# Patient Record
Sex: MALE | Race: BLACK OR AFRICAN AMERICAN | Employment: UNEMPLOYED | URBAN - METROPOLITAN AREA
[De-identification: names, ages, dates, MRNs, and addresses within clinical notes are randomized per-mention and may not be internally consistent; named-entity substitution may affect disease eponyms.]

---

## 2020-10-16 ENCOUNTER — TELEPHONE (OUTPATIENT)
Dept: CARDIOLOGY CLINIC | Age: 40
End: 2020-10-16

## 2020-10-16 NOTE — TELEPHONE ENCOUNTER
Patient called back to reschedule new patient appointment due to transportation issues. Appointment is scheduled on Thursday October 22nd at 1pm w/JEREMY Patterson

## 2020-10-16 NOTE — TELEPHONE ENCOUNTER
New Referral received from Dr. Davida Franklin . Office number #  Fax number #[phone]    Patient has been contacted and scheduled with Dr. Lucrecia Cheek on Wednesday October 21st at 1pm.    Informed referring office that patient has been scheduled.

## 2020-10-22 ENCOUNTER — TELEPHONE (OUTPATIENT)
Dept: CARDIOLOGY CLINIC | Age: 40
End: 2020-10-22

## 2020-10-22 ENCOUNTER — OFFICE VISIT (OUTPATIENT)
Dept: CARDIOLOGY CLINIC | Age: 40
End: 2020-10-22
Payer: MEDICAID

## 2020-10-22 VITALS
WEIGHT: 173.6 LBS | SYSTOLIC BLOOD PRESSURE: 96 MMHG | BODY MASS INDEX: 25.71 KG/M2 | HEIGHT: 69 IN | HEART RATE: 67 BPM | OXYGEN SATURATION: 98 % | DIASTOLIC BLOOD PRESSURE: 62 MMHG | TEMPERATURE: 97.6 F

## 2020-10-22 DIAGNOSIS — R06.02 SHORTNESS OF BREATH: ICD-10-CM

## 2020-10-22 DIAGNOSIS — E55.9 VITAMIN D DEFICIENCY: ICD-10-CM

## 2020-10-22 DIAGNOSIS — R53.83 FATIGUE, UNSPECIFIED TYPE: ICD-10-CM

## 2020-10-22 DIAGNOSIS — Z11.59 ENCOUNTER FOR HEPATITIS C SCREENING TEST FOR LOW RISK PATIENT: ICD-10-CM

## 2020-10-22 DIAGNOSIS — M10.9 GOUT, UNSPECIFIED CAUSE, UNSPECIFIED CHRONICITY, UNSPECIFIED SITE: ICD-10-CM

## 2020-10-22 DIAGNOSIS — Z11.4 SCREENING FOR HIV (HUMAN IMMUNODEFICIENCY VIRUS): ICD-10-CM

## 2020-10-22 DIAGNOSIS — R68.89 SUSPECTED LYME DISEASE: ICD-10-CM

## 2020-10-22 DIAGNOSIS — I50.9 CONGESTIVE HEART FAILURE, UNSPECIFIED HF CHRONICITY, UNSPECIFIED HEART FAILURE TYPE (HCC): Primary | ICD-10-CM

## 2020-10-22 DIAGNOSIS — I50.9 CONGESTIVE HEART FAILURE, UNSPECIFIED HF CHRONICITY, UNSPECIFIED HEART FAILURE TYPE (HCC): ICD-10-CM

## 2020-10-22 PROCEDURE — 93000 ELECTROCARDIOGRAM COMPLETE: CPT | Performed by: INTERNAL MEDICINE

## 2020-10-22 PROCEDURE — 99205 OFFICE O/P NEW HI 60 MIN: CPT | Performed by: INTERNAL MEDICINE

## 2020-10-22 RX ORDER — FUROSEMIDE 40 MG/1
TABLET ORAL DAILY
COMMUNITY
End: 2020-11-16 | Stop reason: SDUPTHER

## 2020-10-22 RX ORDER — FUROSEMIDE 40 MG/1
40 TABLET ORAL DAILY
Qty: 30 TAB | Refills: 1 | Status: SHIPPED | OUTPATIENT
Start: 2020-10-22 | End: 2020-11-17

## 2020-10-22 RX ORDER — SACUBITRIL AND VALSARTAN 24; 26 MG/1; MG/1
1 TABLET, FILM COATED ORAL 2 TIMES DAILY
COMMUNITY
End: 2020-11-06 | Stop reason: SDUPTHER

## 2020-10-22 RX ORDER — CARVEDILOL 3.12 MG/1
3.12 TABLET ORAL 2 TIMES DAILY WITH MEALS
Qty: 30 TAB | Refills: 1 | Status: SHIPPED | OUTPATIENT
Start: 2020-10-22 | End: 2021-01-10

## 2020-10-22 RX ORDER — WARFARIN 7.5 MG/1
7.5 TABLET ORAL DAILY
COMMUNITY
End: 2022-10-19 | Stop reason: ALTCHOICE

## 2020-10-22 RX ORDER — CARVEDILOL 6.25 MG/1
TABLET ORAL 2 TIMES DAILY WITH MEALS
COMMUNITY
End: 2020-10-22 | Stop reason: DRUGHIGH

## 2020-10-22 RX ORDER — SPIRONOLACTONE 25 MG/1
25 TABLET ORAL DAILY
COMMUNITY
End: 2020-11-06

## 2020-10-22 NOTE — PROGRESS NOTES
[facility] in [place], [facility] Note    Patient name: Eyad Duran  Patient : 1980  Patient MRN: [id number]  Date of service: 10/22/20    Primary care physician: No primary care provider on file.   Primary general cardiologist:  Dr. Imelda Aguilera, NP  Primary F cardiologist: Latoya Mancia MD    CHIEF COMPLAINT:  Chronic systolic heart failure    PLAN:  · Patient with severe cardiomyopathy and advanced heart failure symptoms, unable to tolerate GDMT due to hypotension, if no recovery of LVEF by 2020 we will initiate workup for heart transplant/LVAD - closest transplant site would be [facility] and we will facilitate referral if needed  · Importance of abstinence from substance use was discussed with patient and mother; patient will need 6 months period substance free to be considered for cardiac replacement therapy and formal enrollment in counseling and treatment program if addiction disorder is identified    Continue current medical therapy for heart failure  Decrease current dose of coreg to 3.125mg twice daily due to hypotension  Decrease current dose of entresto to one/half tablet of 24/26mg twice daily  Continue current dose of spironolactone 25mg daily  Continue current dose of lasix 40mg daily  Not on allopurinol or uloric, check uric acid level  Chronic anticoagulation with coumadin, followed by primary cardiologist  Continue high dose vitamin D weekly, check levels  Does not take ASA or lipitor  Continue lifevest for at least 3 months or until resolution of LV thrombus before referral for ICD   Consider sleep study; prefer when off THC  Schedule cardiac MRI   Obtain genetic testing  Counseled on echo/ekg screen of first degree relatives and children starting in adolescence  Labs for heart failure: CBC, BMP, LFT, pro-NT-BNP, iron profile, thyroid profile, vitamin D level, lipid profile, CPK, gammopathy profile, UA with culture and pregnancy test, HIV, hepatitis, coxackie abs A and B, Lyme abs, parvovirus PCR, HHV 7, mycoplasma abs, sed rate, D-dimer  Reinforced low salt diet  Reinforced fluid restriction to 6 x 8oz glasses per day  Commanded on tobacco and alcohol abstinence  Counseled on importance of THC wean; concern re: addiction disorder and provided with counseling options  Provided patient with arm blood pressure cuff  Document in diary BP/HR and daily weights  Provided educational materials \"Living with heart failure\"   Provided advanced care plan forms to be filled out    Offerred nutritionist for HF diet  Follow-up with primary cardiologist  Recommend flu and pneumonia vaccinations  Provided patient education materials for COVID precautions  Obtain ischemic evaluation from referring MD  Provided list of counselers who might have if patient has difficulty stopping THC  Return to [facility] in one month with NP/MD to review results of testing    IMPRESSION:  Fatigue  Shortness of breath  Volume overload, mild  Chronic systolic heart failure   Stage C, NYHA class IIIA symptoms   Patient diagnosed with heart failure 3 years ago (per patient report, no records)   Reportedly non-ischemic cardiomyopathy, LVEF 10%   H/o chronic troponin elevation   No record of LHC   LV thrombus 2.7 x 3cm (by echo 9/2020)  · H/o renal infarcts  · H/o splenic infarcts  · Chronic anticoagulation with coumadin  At risk for sudden cardiac death  · Lifevest  Cardiac risk factors   HTN   HL   Former tobacco use, 1.25 pack years, quit 2015   Alcohol use, 5-6 drinks >4 times weekly, quit 9/2020   Marijuana, daily  CKD, stage 3 (baseline Cr 2.3)  Bronchitis  Anxiety    CARDIAC IMAGING:  Echo (9/16/20) LVEF 10%, LVEDD 5.4cm, LV thrombus 2.7x 3cm, reduced RV function, mild PAH  EKG (9/19/20) sinus tachycardia 102bpm, QRS 95ms  LHC no records    HEMODYNAMICS:  RHC not done  CPEST not done  6MW not done    FAMILY HISTORY:  Mother - hypertension  Father - stroke  Sister - no known problems  Maternal grandfather had heart failure requirin heart transplant  All of maternal grandfather's brothers and sisters with heart failure  Maternal cousin with heart failure  Maternal cousin with obesity    SOCIAL HISTORY:  Not working since 9/2020; applied for disability  Lives in [place], [place] - this is 2[place] hours from [place] and 2 hours from [facility], 2 biological children (one years old and 8years old, both healthy)  Former tobacco use, 1.25 pack years, quit 2015  Alcohol use, 5-6 drinks >4 times weekly, quit 9/2020  Marijuana, daily  Lives alone    HISTORY OF PRESENT ILLNESS:  I had the pleasure of seeing Susan Alvares in [address] at [facility] in [place]. Briefly, Susan Alvares is a 38 y.o. male with h/o tobacco (quite 2015), alcohol (quit 9/2020) and THC use (daily) and strong family history of several members with early cardiomyopathy and cardiac deaths. Patient presents with chronic systolic heart failure, stage C, NYHA class IIIA symptoms, states was diagnosed with heart failure 3 years ago (per patient report, no records). He was off medications for several months. Patient was then admitted with acute heart failure, reportedly non-ischemic cardiomyopathy, LVEF 10% (no record of [facility]), LV thrombus 2.7 x 3cm (by echo 9/2020) c/b splenic and renal infarcts started on chronic anticoagulation with coumadin. Discharged home with lifevest and referred to [facility] today for further evaluation and treatment of severe cardiomyopathy. Patient lives [place] hours from [place], he lives closer to [place] but for the next couple of months would like to follow with us. If needs heart transplant [facility] would be the closer site. INTERVAL HISTORY:  Today, patient presents for initial clinic visit accompanied by his mother. Patient is doing \"okay\".     Patient can walk on a flat surface only for 10 minutes and has to stop due to fatigue and dyspnea. Patient states he can walk more than one block without symptoms of fatigue or shortness of breath. Patient cannot walk one flight of stairs; he has fatigue and/or shortness of breath at few steps. Patient lives alone but can perform home activities without problem. .     Patient denies symptoms of volume overload now, leg edema much improved with diuretics though still 1+ BLE. Patient denies abdominal bloating or change of appetite. Patient's weight remained stable. Patient denies orthopnea, PND or significant nocturia. This too improved with diuretics    Patient denies irregular heart rate or palpitations. [employer] has not fired. He does not have BP cuff at home. Patient denies other cardiac symptoms such as chest pain or leg pain with walking. Patient is compliant with fluid restriction and taking medications as prescribed. Patient manages his own medications. REVIEW OF SYSTEMS:  General: Denies fever, night sweats. Ear, nose and throat: Denies difficulty hearing, sinus problems, runny nose, post-nasal drip, ringing in ears, mouth sores, loose teeth, ear pain, nosebleeds, sore throate, facial pain or numbess  Cardiovascular: see above in the interval history  Respiratory: Denies cough, wheezing, sputum production, hemoptysis.   Gastrointestinal: Denies heartburn, constipation, intolerance to certain foods, diarrhea, abdominal pain, nausea, vomiting, difficulty swallowing, blood in stool  Kidney and bladder: Denies painful urination, frequent urination, urgency, prostate problems and impotence  Musculoskeletal: Denies joint pain, muscle weakness  Skin and hair: Denies change in existing skin lesions, hair loss or increase, breast changes    PHYSICAL EXAM:  Visit Vitals  BP 96/62 (BP 1 Location: Left arm, BP Patient Position: At rest)   Pulse 67   Temp 97.6 °F (36.4 °C) (Oral)   Ht 5' 9\" (1.753 m)   Wt 173 lb 9.6 oz (78.7 kg)   SpO2 98%   BMI 25.64 kg/m²     General: Patient is well developed, well-nourished in no acute distress  HEENT: Normocephalic and atraumatic. No scleral icterus. Pupils are equal, round and reactive to light and accomodation. No conjunctival injection. Oropharynx is clear. Neck: Supple. No evidence of thyroid enlargements or lymphadenopathy. JVD: Cannot be appreciated   Lungs: Breath sounds are equal and clear bilaterally. No wheezes, rhonchi, or rales. Heart: Regular rate and rhythm with normal S1 and S2. No murmurs, gallops or rubs. Abdomen: Soft, no mass or tenderness. No organomegaly or hernia. Bowel sounds present. Genitourinary and rectal: deferred  Extremities: No cyanosis, clubbing, or edema. Neurologic: No focal sensory or motor deficits are noted. Grossly intact. Psychiatric: Awake, alert an doriented x 3. Appropriate mood and affect. Skin: Warm, dry and well perfused. No lesions, nodules or rashes are noted. Current Outpatient Medications on File Prior to Visit   Medication Sig Dispense Refill    spironolactone (ALDACTONE) 25 mg tablet Take 25 mg by mouth daily. 0.5 tab daily      sacubitriL-valsartan (Entresto) 24-26 mg tablet Take 1 Tab by mouth two (2) times a day.  warfarin (COUMADIN) 7.5 mg tablet Take 7.5 mg by mouth daily.  furosemide (LASIX) 40 mg tablet Take  by mouth daily. No current facility-administered medications on file prior to visit. Thank you for your referral and allowing me to participate in this patient's care.     Shawn Richardson MD PhD  [address] [place] [address], [address]  Phone: [phone]  Fax: [phone]    Total visit time: 85 minutes (> 50% spent face-to-face counseling)

## 2020-10-22 NOTE — PATIENT INSTRUCTIONS
Medication changes:    DECREASE carvedilol (coreg) to 3.125 mg-one tab twice a day. A new prescription has been sent to your pharmacy. You can take 1/2 of the 6.25 mg tablets twice a day until you  your new prescription. DECREASE sacubitril-valsartan (Entresto) 24-26 mg to 1/2 tab twice a day. Change Furosemide (lasix) take 40 mg in the morning and 20 mg (1/2 tab) in the afternoon until you reach lose 3 pounds. Then go back to 40 mg once a day. A calendar provided today to log daily blood pressures and weights. Call  Monday with your readings. Please take this to your pharmacy to notify them of the change in medications. Testing Ordered:    Orthostatics done in clinic today. EKG completed today. To to [facility] today and have labs drawn. You will be notified of any abnormal results    An order for cardiac MRI has been placed to be done. You will be receiving an automated call from Coordination of Care to schedule this test. If you are unavailable to receive the call or would like to contact coordination of care yourself you may contact [phone] to schedule. Other Recommendations:    No alcohol, tobacco or marijuana. Please call Everette Mccarthy with [facility] to discuss downloading readings. [phone]     Blood pressure cuff provided today. Please take your blood pressure in the morning before medications and then again 2 hours after your medications. Call Monday with Blood pressure readings    Living with Heart Failure booklet provided today. Referral to [address] [place] [address],[address] will be contacted for scheduling. Advanced medical directive paperwork provided today. Please complete and bring to next office visit. Sign at the next visit. List of psychologists provided today. Ensure your drinking an adequate amount of water with a goal of 6-8 eight ounce glasses (1.5-2 liters) of fluid daily. Your urine should be clear and light yellow straw colored.       If your blood pressure begins to consistently run below 90/60 and/or you begin to experience dizziness or lightheadedness, please contact the [facility]il[facility]to [facility]ime [facility] at [phone]. Follow up  One month with Edilberto Jaime 1721 MD/NP    Please monitor your blood pressures daily prior to medications and 2 hours after taking medications. Bring a written record of your blood pressures to your next appointment. Please monitor your weights daily upon waking and after using the bathroom. Keep a written records of your weights and bring to your next appointment. If you have a weight gain of 3 or more pounds overnight OR 5 or more pounds in one week please contact our office. Thank you for allowing us the privilege of being a part of your healthcare team! Please do not hesitate to contact our office at [phone] with any questions or concerns. Advance Directives: Care Instructions  Overview  An advance directive is a legal way to state your wishes at the end of your life. It tells your family and your doctor what to do if you can't say what you want. There are two main types of advance directives. You can change them any time your wishes change. Living will. This form tells your family and your doctor your wishes about life support and other treatment. The form is also called a declaration. Medical power of . This form lets you name a person to make treatment decisions for you when you can't speak for yourself. This person is called a health care agent (health care proxy, health care surrogate). The form is also called a durable power of  for health care. If you do not have an advance directive, decisions about your medical care may be made by a family member, or by a doctor or a  who doesn't know you. It may help to think of an advance directive as a gift to the people who care for you. If you have one, they won't have to make tough decisions by themselves.   Follow-up care is a key part of your treatment and safety. Be sure to make and go to all appointments, and call your doctor if you are having problems. It's also a good idea to know your test results and keep a list of the medicines you take. What should you include in an advance directive? Many states have a unique advance directive form. (It may ask you to address specific issues.) Or you might use a universal form that's approved by many states. If your form doesn't tell you what to address, it may be hard to know what to include in your advance directive. Use the questions below to help you get started. · Who do you want to make decisions about your medical care if you are not able to? · What life-support measures do you want if you have a serious illness that gets worse over time or can't be cured? · What are you most afraid of that might happen? (Maybe you're afraid of having pain, losing your independence, or being kept alive by machines.)  · Where would you prefer to die? (Your home? A hospital? A nursing home?)  · Do you want to donate your organs when you die? · Do you want certain Mormon practices performed before you die? When should you call for help? Be sure to contact your doctor if you have any questions. Where can you learn more? Go to http://www.gray.com/  Enter R264 in the search box to learn more about \"Advance Directives: Care Instructions. \"  Current as of: December 9, 2019               Content Version: 12.6  © 5670-1155 [employer]. Care instructions adapted under license by [employer] (which disclaims liability or warranty for this information). If you have questions about a medical condition or this instruction, always ask your healthcare professional. [place] [address] any warranty or liability for your use of this information.       Low Sodium Diet (2,000 Milligram): Care Instructions  Your Care Instructions     Too much sodium causes your body to hold on to extra water. This can raise your blood pressure and force your heart and kidneys to work harder. In very serious cases, this could cause you to be put in the hospital. It might even be life-threatening. By limiting sodium, you will feel better and lower your risk of serious problems. The most common source of sodium is salt. People get most of the salt in their diet from canned, prepared, and packaged foods. Fast food and restaurant meals also are very high in sodium. Your doctor will probably limit your sodium to less than 2,000 milligrams (mg) a day. This limit counts all the sodium in prepared and packaged foods and any salt you add to your food. Follow-up care is a key part of your treatment and safety. Be sure to make and go to all appointments, and call your doctor if you are having problems. It's also a good idea to know your test results and keep a list of the medicines you take. How can you care for yourself at home? Read food labels  · Read labels on cans and food packages. The labels tell you how much sodium is in each serving. Make sure that you look at the serving size. If you eat more than the serving size, you have eaten more sodium. · Food labels also tell you the Percent Daily Value for sodium. Choose products with low Percent Daily Values for sodium. · Be aware that sodium can come in forms other than salt, including monosodium glutamate (MSG), sodium citrate, and sodium bicarbonate (baking soda). MSG is often added to Asian food. When you eat out, you can sometimes ask for food without MSG or added salt. Buy low-sodium foods  · Buy foods that are labeled \"unsalted\" (no salt added), \"sodium-free\" (less than 5 mg of sodium per serving), or \"low-sodium\" (less than 140 mg of sodium per serving). Foods labeled \"reduced-sodium\" and \"light sodium\" may still have too much sodium. Be sure to read the label to see how much sodium you are getting.   · Buy fresh vegetables, or frozen vegetables without added sauces. Buy low-sodium versions of canned vegetables, soups, and other canned goods. Prepare low-sodium meals  · Cut back on the amount of salt you use in cooking. This will help you adjust to the taste. Do not add salt after cooking. One teaspoon of salt has about 2,300 mg of sodium. · Take the salt shaker off the table. · Flavor your food with garlic, lemon juice, onion, vinegar, herbs, and spices. Do not use soy sauce, lite soy sauce, steak sauce, onion salt, garlic salt, celery salt, mustard, or ketchup on your food. · Use low-sodium salad dressings, sauces, and ketchup. Or make your own salad dressings and sauces without adding salt. · Use less salt (or none) when recipes call for it. You can often use half the salt a recipe calls for without losing flavor. Other foods such as rice, pasta, and grains do not need added salt. · Rinse canned vegetables, and cook them in fresh water. This removes some--but not all--of the salt. · Avoid water that is naturally high in sodium or that has been treated with water softeners, which add sodium. Call your local water company to find out the sodium content of your water supply. If you buy bottled water, read the label and choose a sodium-free brand. Avoid high-sodium foods  · Avoid eating:  ? Smoked, cured, salted, and canned meat, fish, and poultry. ? Ham, fields, hot dogs, and luncheon meats. ? Regular, hard, and processed cheese and regular peanut butter. ? Crackers with salted tops, and other salted snack foods such as pretzels, chips, and salted popcorn. ? Frozen prepared meals, unless labeled low-sodium. ? Canned and dried soups, broths, and bouillon, unless labeled sodium-free or low-sodium. ? Canned vegetables, unless labeled sodium-free or low-sodium. ? [place] fries, pizza, tacos, and other fast foods.   ? Pickles, olives, ketchup, and other condiments, especially soy sauce, unless labeled sodium-free or low-sodium. Where can you learn more? Go to http://www.gray.com/  Enter V843 in the search box to learn more about \"Low Sodium Diet (2,000 Milligram): Care Instructions. \"  Current as of: August 22, 2019               Content Version: 12.6  © 8854-0113 [employer]. Care instructions adapted under license by [employer] (which disclaims liability or warranty for this information). If you have questions about a medical condition or this instruction, always ask your healthcare professional. [place] [address] any warranty or liability for your use of this information. Heart Failure Patients and COVID-19  March 31, 2020 in Heart Failure Research News, [facility] News     A Statement from the [address]) wants to ensure that heart failure patients are appropriately informed during the novel Coronavirus COVID-19 pandemic. COVID-19 symptoms vary among infected people and can include cough, fever, shortness of breath, muscle aches, profound fatigue, loss of sense of smell and taste, and diarrhea. Not every infected person will have symptoms which is why social distancing is imperative. Most people have only mild symptoms, but others have severe symptoms that require hospitalization and occasionally intensive care. Because you are a patient with a heart failure, you are at higher risk of getting very sick if you contract Coronavirus and, therefore, it is important to practice good hand hygiene, social distancing, and staying at home as much as possible. If you are experiencing symptoms of COVID-19, please call your primary healthcare clinician.  For your safety and the safety of others, and to reduce potential exposures to the Coronavirus, please do not go to an urgent care clinic or emergency room for non-urgent or non-emergency issues unless you have been instructed to do so by your primary healthcare clinician. However, if you have life-threatening symptoms like difficulty breathing, call 911. We want to reduce the spread of the Coronavirus as much as possible and make sure our healthcare resources are not overwhelmed with non-urgent cases. You may have noticed that your healthcare clinicians have converted your in-person appointments to telephone or video calls, and some hospitals are not allowing visitors. The goal is to keep patients from taran the Coronavirus and to limit the number of healthcare workers in the hospital. If you are sick and need to be seen or get lab testing, you should still do so; otherwise, you can help by     1. Learning how to use your smartphone or computer to participate in telehealth video visits  2. Keeping track of missed visits and studies and working with your team to reschedule them once social distancing measures are relaxed     Also, do not stop any of your medications unless instructed by your healthcare team to do so. It is important that you have an adequate supply of your heart failure medications and that you request extended duration of supplies and refills from your providers during these times. We have a lot to learn about COVID-19 and currently there are several ongoing clinical trials to discover therapies that might help treat the infection and vaccines that can prevent the infection. The [facility] and other scientific institutions are working hard, with our patients in mind, to get through this pandemic as quickly as possible. Finally, we recommend that you get your information from trusted, professional healthcare sources including national societies like the [employer], federal agencies like the [employer], and your local hospitals and health departments. Please access updated patient information on the [facility] [phone].      We wish you safety and health during this challenging time.

## 2020-10-26 LAB
25(OH)D3+25(OH)D2 SERPL-MCNC: 34.2 NG/ML (ref 30–100)
ALBUMIN SERPL ELPH-MCNC: 3.8 G/DL (ref 2.9–4.4)
ALBUMIN SERPL-MCNC: 4.1 G/DL (ref 4–5)
ALBUMIN/GLOB SERPL: 1.7 {RATIO} (ref 0.7–1.7)
ALBUMIN/GLOB SERPL: 2.1 {RATIO} (ref 1.2–2.2)
ALP SERPL-CCNC: 144 IU/L (ref 39–117)
ALPHA1 GLOB SERPL ELPH-MCNC: 0.2 G/DL (ref 0–0.4)
ALPHA2 GLOB SERPL ELPH-MCNC: 0.6 G/DL (ref 0.4–1)
ALT SERPL-CCNC: 41 IU/L (ref 0–44)
APPEARANCE UR: CLEAR
AST SERPL-CCNC: 33 IU/L (ref 0–40)
B BURGDOR IGG+IGM SER-ACNC: <0.91 ISR (ref 0–0.9)
B-GLOBULIN SERPL ELPH-MCNC: 1 G/DL (ref 0.7–1.3)
B19V IGG SER IA-ACNC: 1.2 INDEX (ref 0–0.8)
B19V IGM SER IA-ACNC: 0.1 INDEX (ref 0–0.8)
B2 MICROGLOB SERPL-MCNC: 2.4 MG/L (ref 0.6–2.4)
BACTERIA #/AREA URNS HPF: ABNORMAL /[HPF]
BILIRUB SERPL-MCNC: 1 MG/DL (ref 0–1.2)
BILIRUB UR QL STRIP: NEGATIVE
BUN SERPL-MCNC: 21 MG/DL (ref 6–24)
BUN/CREAT SERPL: 14 (ref 9–20)
CALCIUM SERPL-MCNC: 9.5 MG/DL (ref 8.7–10.2)
CASTS URNS MICRO: ABNORMAL
CASTS URNS QL MICRO: PRESENT /LPF
CHLORIDE SERPL-SCNC: 104 MMOL/L (ref 96–106)
CHOLEST SERPL-MCNC: 140 MG/DL (ref 100–199)
CK SERPL-CCNC: 112 U/L (ref 49–439)
CO2 SERPL-SCNC: 22 MMOL/L (ref 20–29)
COLOR UR: YELLOW
CREAT SERPL-MCNC: 1.54 MG/DL (ref 0.76–1.27)
CV B1 AB TITR SER CF: NEGATIVE {TITER}
CV B2 AB TITR SER CF: ABNORMAL {TITER}
CV B3 AB TITR SER CF: ABNORMAL {TITER}
CV B4 AB TITR SER CF: ABNORMAL {TITER}
CV B5 AB TITR SER CF: ABNORMAL {TITER}
CV B6 AB TITR SER CF: ABNORMAL {TITER}
D DIMER PPP FEU-MCNC: 0.27 MG/L FEU (ref 0–0.49)
EPI CELLS #/AREA URNS HPF: ABNORMAL /HPF (ref 0–10)
ERYTHROCYTE [DISTWIDTH] IN BLOOD BY AUTOMATED COUNT: 13.8 % (ref 11.6–15.4)
ERYTHROCYTE [SEDIMENTATION RATE] IN BLOOD BY WESTERGREN METHOD: 2 MM/HR (ref 0–15)
GAMMA GLOB SERPL ELPH-MCNC: 0.6 G/DL (ref 0.4–1.8)
GLOBULIN SER CALC-MCNC: 2 G/DL (ref 1.5–4.5)
GLOBULIN SER-MCNC: 2.3 G/DL (ref 2.2–3.9)
GLUCOSE SERPL-MCNC: 114 MG/DL (ref 65–99)
GLUCOSE UR QL: NEGATIVE
HAV IGM SERPL QL IA: NEGATIVE
HBV CORE IGM SERPL QL IA: NEGATIVE
HBV SURFACE AG SERPL QL IA: NEGATIVE
HCT VFR BLD AUTO: 47.1 % (ref 37.5–51)
HCV AB S/CO SERPL IA: <0.1 S/CO RATIO (ref 0–0.9)
HDLC SERPL-MCNC: 43 MG/DL
HGB BLD-MCNC: 15.5 G/DL (ref 13–17.7)
HGB UR QL STRIP: NEGATIVE
HIV 1+2 AB+HIV1 P24 AG SERPL QL IA: NON REACTIVE
IGA SERPL-MCNC: 231 MG/DL (ref 90–386)
IGG SERPL-MCNC: 751 MG/DL (ref 603–1613)
IGM SERPL-MCNC: 23 MG/DL (ref 20–172)
INTERPRETATION SERPL IEP-IMP: ABNORMAL
KAPPA LC FREE SER-MCNC: 28.3 MG/L (ref 3.3–19.4)
KAPPA LC FREE/LAMBDA FREE SER: 1.42 {RATIO} (ref 0.26–1.65)
KETONES UR QL STRIP: NEGATIVE
LAMBDA LC FREE SERPL-MCNC: 20 MG/L (ref 5.7–26.3)
LDLC SERPL CALC-MCNC: 79 MG/DL (ref 0–99)
LEUKOCYTE ESTERASE UR QL STRIP: NEGATIVE
M PROTEIN SERPL ELPH-MCNC: ABNORMAL G/DL
MAGNESIUM SERPL-MCNC: 2.4 MG/DL (ref 1.6–2.3)
MCH RBC QN AUTO: 28.2 PG (ref 26.6–33)
MCHC RBC AUTO-ENTMCNC: 32.9 G/DL (ref 31.5–35.7)
MCV RBC AUTO: 86 FL (ref 79–97)
MICRO URNS: ABNORMAL
MUCOUS THREADS URNS QL MICRO: PRESENT
NITRITE UR QL STRIP: NEGATIVE
NT-PROBNP SERPL-MCNC: 5125 PG/ML (ref 0–86)
PH UR STRIP: 5.5 [PH] (ref 5–7.5)
PLATELET # BLD AUTO: 197 X10E3/UL (ref 150–450)
PLEASE NOTE:, 149534: ABNORMAL
POTASSIUM SERPL-SCNC: 5 MMOL/L (ref 3.5–5.2)
PROT SERPL-MCNC: 6.1 G/DL (ref 6–8.5)
PROT UR QL STRIP: ABNORMAL
RBC # BLD AUTO: 5.5 X10E6/UL (ref 4.14–5.8)
RBC #/AREA URNS HPF: ABNORMAL /HPF (ref 0–2)
SODIUM SERPL-SCNC: 139 MMOL/L (ref 134–144)
SP GR UR: 1.01 (ref 1–1.03)
T3FREE SERPL-MCNC: 2.8 PG/ML (ref 2–4.4)
T4 FREE SERPL-MCNC: 1.42 NG/DL (ref 0.82–1.77)
TRIGL SERPL-MCNC: 98 MG/DL (ref 0–149)
TSH SERPL DL<=0.005 MIU/L-ACNC: 2.44 UIU/ML (ref 0.45–4.5)
URATE SERPL-MCNC: 10.5 MG/DL (ref 3.7–8.6)
UROBILINOGEN UR STRIP-MCNC: 0.2 MG/DL (ref 0.2–1)
VLDLC SERPL CALC-MCNC: 18 MG/DL (ref 5–40)
WBC # BLD AUTO: 6.5 X10E3/UL (ref 3.4–10.8)
WBC #/AREA URNS HPF: ABNORMAL /HPF (ref 0–5)

## 2020-10-30 ENCOUNTER — TELEPHONE (OUTPATIENT)
Dept: CARDIOLOGY CLINIC | Age: 40
End: 2020-10-30

## 2020-10-30 NOTE — TELEPHONE ENCOUNTER
Attempted to call patient on mobile number and no voicemail, attempted to call patient on home number and left voicemail to return my call to discuss lab results per Dr. Daley Course start allopurinol 100 mg daily per Dr. Karen Johnson and patient also needs follow up appt for one month.

## 2020-11-02 ENCOUNTER — TELEPHONE (OUTPATIENT)
Dept: CARDIOLOGY CLINIC | Age: 40
End: 2020-11-02

## 2020-11-02 DIAGNOSIS — I50.9 CONGESTIVE HEART FAILURE, UNSPECIFIED HF CHRONICITY, UNSPECIFIED HEART FAILURE TYPE (HCC): Primary | ICD-10-CM

## 2020-11-02 DIAGNOSIS — M10.00 IDIOPATHIC GOUT, UNSPECIFIED CHRONICITY, UNSPECIFIED SITE: ICD-10-CM

## 2020-11-02 DIAGNOSIS — M10.9 GOUT, UNSPECIFIED CAUSE, UNSPECIFIED CHRONICITY, UNSPECIFIED SITE: ICD-10-CM

## 2020-11-02 NOTE — TELEPHONE ENCOUNTER
Follow up call to patient regarding BP readings and weights  Goal to lose 3 lbs pt to take furosemide 40 mg in a.m. and 20 mg in p.m. until pt loses 3 lbs. (confirm picked up new rx for cavedilol, taking 1/2 tab of Entresto 24-26 mg BID,   LVM with my contact information and instructions to return my call. Called home #; LVM. Called work # which patient states is his mobile number. Patient was not home and could not provide weights but believes he has gained 3 lbs. Asked patient to call this writer back today with weights and will discuss medications at that time. Pt reports he is almost out of medications. Patient also needs a follow up appointment within the next 2-3 weeks.

## 2020-11-05 RX ORDER — ACETAMINOPHEN 500 MG
2000 TABLET ORAL DAILY
Qty: 30 CAP | Refills: 3 | Status: SHIPPED | OUTPATIENT
Start: 2020-11-05 | End: 2021-02-19 | Stop reason: DRUGHIGH

## 2020-11-05 NOTE — TELEPHONE ENCOUNTER
Called patient for weights. Patient reports the following: 10/30: 179 lbs  10/31: 179 lbs  11/1:   177 lbs  11/2    179 lbs  11/3    179 lbs  No weights for 11/4 or 11/5  Pt reports taking furosemide 40 mg daily. Pt states he is running out of spironolactone, entresto and furosemide. Confirmed with pharmacy the furosemide is there waiting to be picked up and patient picked up carvedilol. Pt will need to start allupurinol 100 mg. Reviewed with Dr. Daniele rodriguez to prescribe with warfarin  Discussed with Dr. Daniele Parra, V.O.R.B. patient to remain on 12.5 mg of spironolactone; Refills for spironolactone and entresto sent to [facility].

## 2020-11-06 RX ORDER — SACUBITRIL AND VALSARTAN 24; 26 MG/1; MG/1
TABLET, FILM COATED ORAL
Qty: 60 TAB | Refills: 0 | Status: SHIPPED | OUTPATIENT
Start: 2020-11-06 | End: 2020-11-16

## 2020-11-06 RX ORDER — ALLOPURINOL 100 MG/1
100 TABLET ORAL DAILY
Qty: 30 TAB | Refills: 0 | Status: SHIPPED | OUTPATIENT
Start: 2020-11-06 | End: 2020-11-16 | Stop reason: SDUPTHER

## 2020-11-06 RX ORDER — SPIRONOLACTONE 25 MG/1
TABLET ORAL
Qty: 30 TAB | Refills: 0 | Status: SHIPPED | OUTPATIENT
Start: 2020-11-06 | End: 2020-11-16

## 2020-11-16 ENCOUNTER — OFFICE VISIT (OUTPATIENT)
Dept: CARDIOLOGY CLINIC | Age: 40
End: 2020-11-16
Payer: MEDICAID

## 2020-11-16 ENCOUNTER — TELEPHONE ANTICOAG (OUTPATIENT)
Dept: CARDIOLOGY CLINIC | Age: 40
End: 2020-11-16

## 2020-11-16 ENCOUNTER — TELEPHONE (OUTPATIENT)
Dept: CARDIOLOGY CLINIC | Age: 40
End: 2020-11-16

## 2020-11-16 VITALS
DIASTOLIC BLOOD PRESSURE: 108 MMHG | BODY MASS INDEX: 24.5 KG/M2 | TEMPERATURE: 98.2 F | HEIGHT: 69 IN | HEART RATE: 99 BPM | SYSTOLIC BLOOD PRESSURE: 138 MMHG | RESPIRATION RATE: 20 BRPM | WEIGHT: 165.4 LBS | OXYGEN SATURATION: 98 %

## 2020-11-16 DIAGNOSIS — E85.9 AMYLOIDOSIS, UNSPECIFIED TYPE (HCC): ICD-10-CM

## 2020-11-16 DIAGNOSIS — R06.02 SHORTNESS OF BREATH: ICD-10-CM

## 2020-11-16 DIAGNOSIS — M10.9 GOUT, UNSPECIFIED CAUSE, UNSPECIFIED CHRONICITY, UNSPECIFIED SITE: ICD-10-CM

## 2020-11-16 DIAGNOSIS — E85.82 WILD-TYPE TRANSTHYRETIN-RELATED (ATTR) AMYLOIDOSIS (HCC): Primary | ICD-10-CM

## 2020-11-16 DIAGNOSIS — I50.9 CONGESTIVE HEART FAILURE, UNSPECIFIED HF CHRONICITY, UNSPECIFIED HEART FAILURE TYPE (HCC): ICD-10-CM

## 2020-11-16 DIAGNOSIS — M10.00 IDIOPATHIC GOUT, UNSPECIFIED CHRONICITY, UNSPECIFIED SITE: ICD-10-CM

## 2020-11-16 LAB
ALBUMIN SERPL-MCNC: 3.8 G/DL (ref 3.5–5)
ALBUMIN/GLOB SERPL: 1.2 {RATIO} (ref 1.1–2.2)
ALP SERPL-CCNC: 150 U/L (ref 45–117)
ALT SERPL-CCNC: 41 U/L (ref 12–78)
ANION GAP SERPL CALC-SCNC: 11 MMOL/L (ref 5–15)
AST SERPL-CCNC: 33 U/L (ref 15–37)
BILIRUB SERPL-MCNC: 2.3 MG/DL (ref 0.2–1)
BNP SERPL-MCNC: ABNORMAL PG/ML
BUN SERPL-MCNC: 23 MG/DL (ref 6–20)
BUN/CREAT SERPL: 15 (ref 12–20)
CALCIUM SERPL-MCNC: 9.4 MG/DL (ref 8.5–10.1)
CHLORIDE SERPL-SCNC: 108 MMOL/L (ref 97–108)
CO2 SERPL-SCNC: 20 MMOL/L (ref 21–32)
CREAT SERPL-MCNC: 1.49 MG/DL (ref 0.7–1.3)
ERYTHROCYTE [DISTWIDTH] IN BLOOD BY AUTOMATED COUNT: 16.6 % (ref 11.5–14.5)
GLOBULIN SER CALC-MCNC: 3.1 G/DL (ref 2–4)
GLUCOSE SERPL-MCNC: 90 MG/DL (ref 65–100)
HCT VFR BLD AUTO: 48.4 % (ref 36.6–50.3)
HGB BLD-MCNC: 15.5 G/DL (ref 12.1–17)
MAGNESIUM SERPL-MCNC: 2.3 MG/DL (ref 1.6–2.4)
MCH RBC QN AUTO: 27.7 PG (ref 26–34)
MCHC RBC AUTO-ENTMCNC: 32 G/DL (ref 30–36.5)
MCV RBC AUTO: 86.6 FL (ref 80–99)
NRBC # BLD: 0 K/UL (ref 0–0.01)
NRBC BLD-RTO: 0 PER 100 WBC
PLATELET # BLD AUTO: 232 K/UL (ref 150–400)
PMV BLD AUTO: 12.7 FL (ref 8.9–12.9)
POTASSIUM SERPL-SCNC: 4.4 MMOL/L (ref 3.5–5.1)
PROT SERPL-MCNC: 6.9 G/DL (ref 6.4–8.2)
RBC # BLD AUTO: 5.59 M/UL (ref 4.1–5.7)
SODIUM SERPL-SCNC: 139 MMOL/L (ref 136–145)
WBC # BLD AUTO: 7.2 K/UL (ref 4.1–11.1)

## 2020-11-16 PROCEDURE — 99214 OFFICE O/P EST MOD 30 MIN: CPT | Performed by: NURSE PRACTITIONER

## 2020-11-16 RX ORDER — ALLOPURINOL 100 MG/1
100 TABLET ORAL DAILY
Qty: 30 TAB | Refills: 5 | Status: SHIPPED | OUTPATIENT
Start: 2020-11-16 | End: 2021-10-28

## 2020-11-16 RX ORDER — SACUBITRIL AND VALSARTAN 24; 26 MG/1; MG/1
0.5 TABLET, FILM COATED ORAL 2 TIMES DAILY
Qty: 60 TAB | Refills: 2 | Status: SHIPPED | OUTPATIENT
Start: 2020-11-16 | End: 2021-06-17 | Stop reason: SDUPTHER

## 2020-11-16 RX ORDER — HYDRALAZINE HYDROCHLORIDE 25 MG/1
25 TABLET, FILM COATED ORAL 3 TIMES DAILY
Qty: 90 TAB | Refills: 2 | Status: SHIPPED | OUTPATIENT
Start: 2020-11-16 | End: 2021-02-17 | Stop reason: ALTCHOICE

## 2020-11-16 RX ORDER — PANTOPRAZOLE SODIUM 20 MG/1
40 TABLET, DELAYED RELEASE ORAL DAILY
Qty: 30 TAB | Refills: 2 | Status: SHIPPED | OUTPATIENT
Start: 2020-11-16 | End: 2021-02-19 | Stop reason: SDUPTHER

## 2020-11-16 RX ORDER — SPIRONOLACTONE 25 MG/1
TABLET ORAL
Qty: 30 TAB | Refills: 2 | Status: SHIPPED | OUTPATIENT
Start: 2020-11-16 | End: 2021-06-17 | Stop reason: SDUPTHER

## 2020-11-16 NOTE — TELEPHONE ENCOUNTER
Telephone Call RE:  Appointment reminder     Outcome:     [x] Patient confirmed appointment   [] Patient rescheduled appointment for    [] Unable to reach   [] Left message              [] Other:       Ricardo Infante

## 2020-11-16 NOTE — PROGRESS NOTES
[facility] in [place], [facility] Note    Patient name: Marlon Alpers  Patient : 1980  Patient MRN: [id number]  Date of service: 20    Primary care physician: None  Primary general cardiologist:  Dr. Annette Greene, NP  Primary [facility] cardiologist: Bob Hylton MD    CHIEF COMPLAINT:  Chronic systolic heart failure    PLAN:  · Patient with severe cardiomyopathy and advanced heart failure symptoms, unable to tolerate GDMT due to hypotension, if no recovery of LVEF by 2020 we will initiate workup for heart transplant/LVAD - closest transplant site would be [facility] and we will facilitate referral if needed  · Importance of abstinence from substance use was discussed with patient and mother; patient will need 6 months period substance free to be considered for cardiac replacement therapy and formal enrollment in counseling and treatment program if addiction disorder is identified    Continue current medical therapy for heart failure  Continue current dose of coreg to 3.125mg twice daily due to hypotension/aTTR  Continue current dose of entresto one/half tablet of 24/26mg twice daily; no uptitration due to aTTR  Continue current dose of spironolactone 25mg daily  Continue current dose of lasix 40mg daily  Begin hydralazine 25 mg PO TID for HTN management  Begin allopurinol 100 mg PO daily   Chronic anticoagulation with coumadin, followed by primary cardiologist  Continue high dose vitamin D weekly, check levels  Does not take ASA or lipitor  Continue lifevest for at least 3 months or until resolution of LV thrombus before referral for ICD; [employer] rep to contact patient re: issues with transmission of data  Consider sleep study; prefer when off [facility]  Schedule PYP    [employer] positive for aTTR; d/w patient and Mother   Information for genetic counseling provided  Based on PYP results, will need to discuss option of Tafamidis   Coxsackie ab and Parvo positifve  Reinforced low salt diet  Reinforced fluid restriction to 6 x 8oz glasses per day  Commanded on tobacco and alcohol abstinence  Counseled on importance of THC wean; concern re: addiction disorder and provided with counseling options  Provided patient with arm blood pressure cuff  Document in diary BP/HR and daily weights  Provided educational materials \"Living with heart failure\"   Provided advanced care plan forms to be filled out    Offerred nutritionist for HF diet  Follow-up with primary cardiologist  Recommend flu and pneumonia vaccinations  Provided patient education materials for COVID precautions  Obtain ischemic evaluation from referring MD  Previously provided list of counselers who might have if patient has difficulty stopping THC  Return to [facility] December 8th for PYP testing and follow up counseling of results    IMPRESSION:  Fatigue  Shortness of breath  Volume overload, mild  Chronic systolic heart failure   Stage C, NYHA class IIIA symptoms   Patient diagnosed with heart failure 3 years ago (per patient report, no records)   Reportedly non-ischemic cardiomyopathy, LVEF 10%   H/o chronic troponin elevation   No record of LHC   LV thrombus 2.7 x 3cm (by echo 9/2020)  · H/o renal infarcts  · H/o splenic infarcts  · Chronic anticoagulation with coumadin  At risk for sudden cardiac death  · Lifevest  Cardiac risk factors   HTN   HL   Former tobacco use, 1.25 pack years, quit 2015   Alcohol use, 5-6 drinks >4 times weekly, quit 9/2020   Marijuana, daily  CKD, stage 3 (baseline Cr 2.3)  Bronchitis  Anxiety    CARDIAC IMAGING:  Echo (9/16/20) LVEF 10%, LVEDD 5.4cm, LV thrombus 2.7x 3cm, reduced RV function, mild PAH  EKG (9/19/20) sinus tachycardia 102bpm, QRS 95ms  LHC no records    HEMODYNAMICS:  RHC not done  CPEST not done  6MW not done    FAMILY HISTORY:  Mother - hypertension  Father - stroke  Sister - no known problems  Maternal grandfather had heart failure requirin heart transplant  All of maternal grandfather's brothers and sisters with heart failure  Maternal cousin with heart failure  Maternal cousin with obesity     SOCIAL HISTORY:  Not working since 9/2020; applied for disability  Lives in [place], [address] [place] [address] - this is 2.5 hours from [place] and 2 hours from [facility], [place], 2 biological children (one years old and 8years old, both healthy)  Former tobacco use, 1.25 pack years, quit 2015  Alcohol use, 5-6 drinks >4 times weekly, quit 9/2020  Marijuana, daily  Lives alone    HISTORY OF PRESENT ILLNESS:  I had the pleasure of seeing Emaline Schwab in [address] at [facility] in [place]. Briefly, Emaline Schwab is a 38 y.o. male with h/o tobacco (quite 2015), alcohol (quit 9/2020) and THC use (daily) and strong family history of several members with early cardiomyopathy and cardiac deaths. Patient presents with chronic systolic heart failure, stage C, NYHA class IIIA symptoms, states was diagnosed with heart failure 3 years ago (per patient report, no records). He was off medications for several months. Patient was then admitted with acute heart failure, reportedly non-ischemic cardiomyopathy, LVEF 10% (no record of [facility]), LV thrombus 2.7 x 3cm (by echo 9/2020) c/b splenic and renal infarcts started on chronic anticoagulation with coumadin. Discharged home with lifevest and referred to [facility] today for further evaluation and treatment of severe cardiomyopathy. Patient lives [place]5 hours from [place], he lives closer to [place] but for the next couple of months would like to follow with us. If needs heart transplant [facility] would be the closer site. INTERVAL HISTORY:  Today, patient presents for initial clinic visit accompanied by his mother. Patient is doing \"okay\". Patient can walk on a flat surface only for 10 minutes and has to stop due to fatigue and dyspnea.  Patient states he can walk more than one block without symptoms of fatigue or shortness of breath. Patient cannot walk one flight of stairs; he has fatigue and/or shortness of breath at few steps. Patient lives alone but can perform home activities without problem. .     Patient denies symptoms of volume overload now, leg edema much improved with diuretics though still 1+ BLE. Patient denies abdominal bloating or change of appetite. Patient's weight remained stable. Patient denies orthopnea, PND or significant nocturia. This too improved with diuretics    Patient denies irregular heart rate or palpitations. Lifevest has not fired. Patient denies other cardiac symptoms such as chest pain or leg pain with walking. Patient is compliant with fluid restriction and taking medications as prescribed. Patient manages his own medications. No recent drug or ETOH use. One pillow orthopnea, appetite is OK, no edema, no abdominal distention, no CP, dizziness, lightheadedness. REVIEW OF SYSTEMS:  General: Denies fever, night sweats. Ear, nose and throat: Denies difficulty hearing, sinus problems, runny nose, post-nasal drip, ringing in ears, mouth sores, loose teeth, ear pain, nosebleeds, sore throate, facial pain or numbess  Cardiovascular: see above in the interval history  Respiratory: Denies cough, wheezing, sputum production, hemoptysis.   Gastrointestinal: Denies heartburn, constipation, intolerance to certain foods, diarrhea, abdominal pain, nausea, vomiting, difficulty swallowing, blood in stool  Kidney and bladder: Denies painful urination, frequent urination, urgency, prostate problems and impotence  Musculoskeletal: Denies joint pain, muscle weakness  Skin and hair: Denies change in existing skin lesions, hair loss or increase, breast changes    PHYSICAL EXAM:  Visit Vitals  BP (!) 138/108 (BP 1 Location: Left arm, BP Patient Position: Sitting)   Pulse 99   Temp 98.2 °F (36.8 °C) (Oral)   Resp 20   Ht 5' 9\" (1.753 m)   Wt 165 lb 6.4 oz (75 kg)   SpO2 98%   BMI 24.43 kg/m²     General: Patient is well developed, well-nourished in no acute distress  HEENT: Normocephalic and atraumatic. No scleral icterus. Pupils are equal, round and reactive to light and accomodation. No conjunctival injection. Oropharynx is clear. Neck: Supple. No evidence of thyroid enlargements or lymphadenopathy. JVD: Cannot be appreciated   Lungs: Breath sounds are equal and clear bilaterally. No wheezes, rhonchi, or rales. Heart: Regular rate and rhythm with normal S1 and S2. No murmurs, gallops or rubs. Abdomen: Soft, no mass or tenderness. No organomegaly or hernia. Bowel sounds present. Genitourinary and rectal: deferred  Extremities: No cyanosis, clubbing, or edema. Neurologic: No focal sensory or motor deficits are noted. Grossly intact. Psychiatric: Awake, alert an doriented x 3. Appropriate mood and affect. Skin: Warm, dry and well perfused. No lesions, nodules or rashes are noted. Current Outpatient Medications on File Prior to Visit   Medication Sig Dispense Refill    spironolactone (ALDACTONE) 25 mg tablet Take 1/2 tab daily 30 Tab 0    sacubitriL-valsartan (Entresto) 24-26 mg tablet Take 1/2 tab two times a day. 60 Tab 0    allopurinoL (ZYLOPRIM) 100 mg tablet Take 1 Tab by mouth daily. 30 Tab 0    cholecalciferol (VITAMIN D3) (2,000 UNITS /50 MCG) cap capsule Take 2,000 Units by mouth daily. 30 Cap 3    warfarin (COUMADIN) 7.5 mg tablet Take 7.5 mg by mouth daily.  furosemide (LASIX) 40 mg tablet Take  by mouth daily.  carvediloL (COREG) 3.125 mg tablet Take 1 Tab by mouth two (2) times daily (with meals). 30 Tab 1    furosemide (LASIX) 40 mg tablet Take 1 Tab by mouth daily. 30 Tab 1     No current facility-administered medications on file prior to visit. Thank you for your referral and allowing me to participate in this patient's care.     Clifford Oconnell NP  [address] [place] [address] [address], [address]  Phone: [phone]  Fax: [phone]    Total visit time: 85 minutes (> 50% spent face-to-face counseling)

## 2020-11-16 NOTE — PATIENT INSTRUCTIONS
Per L. Polliard, NP. INR 3.5- hold warfarin 7.5 mg Tuesday 11/17/20. Patient must contact coumadin clinic tomorrow for continued dosing since Dr. Royal Murillo is managing ([phone]).

## 2020-11-16 NOTE — PATIENT INSTRUCTIONS
Medication changes:    BEGIN taking hydralazine, 1 tablet three times daily. This is for your blood pressure. Please take this to your pharmacy to notify them of the change in medications. Testing Ordered:    PYP testing has been scheduled for 12/8/2020 at 10:00 . Please arrive by to check in. 9:45 This test is located at the [facility] at [address], [address] [facility],[address], [facility], [address]. If this appointment does not work for you, please contact the [facility] [place] at [phone] to reschedule. If you need to reschedule the above test, please call [phone]    We have drawn labs today. We will call you with the results. Other Recommendations:     Santiago Monroy from [address] will be calling you today. [facility] genetic testing results have been reviewed today. Your testing results qualify for complimentary genetic counseling through [facility]. Please visit [email or web address] to schedule your telephone genetic counseling appointment. Your RQ number from your test is [id number]. You will be asked for this number when you schedule the appointment. Your [facility] Genetic Testing results qualify for complimentary family testing for up to two blood relatives. If you would like to proceed with family testing, please contact the [address] [place] [address] at [phone] Option 2 to provide the following information:    -Name of Family Member  -Date of Birth  -Home Address  -Phone Number  -Email (Optional)   -Any cardiac history (Optional)    Our office will order the test and a testing kit will be sent directly to the home of your family member. Instructions for specimen collection and shipping material to return the specimen to [email or web address] will be provided with the kit. This test is complimentary and is at no cost to you or your family members.       Ensure your drinking an adequate amount of water with a goal of 6-8 eight ounce glasses (1.5-2 liters) of fluid daily. Your urine should be clear and light yellow straw colored. If your blood pressure begins to consistently run below 90/60 and/or you begin to experience dizziness or lightheadedness, please contact the Clarissa Mahan at [phone]. Follow up 2 weeks with [facility]    Please monitor your blood pressures daily prior to medications and 2 hours after taking medications. Bring a written record of your blood pressures to your next appointment. Please monitor your weights daily upon waking and after using the bathroom. Keep a written records of your weights and bring to your next appointment. If you have a weight gain of 3 or more pounds overnight OR 5 or more pounds in one week please contact our office. Thank you for allowing us the privilege of being a part of your healthcare team! Please do not hesitate to contact our office at [phone] with any questions or concerns. Heart Failure Patients and COVID-19  March 31, 2020 in Heart Failure Research News, HFSA News     A Statement from the [address]) wants to ensure that heart failure patients are appropriately informed during the novel Coronavirus COVID-19 pandemic. COVID-19 symptoms vary among infected people and can include cough, fever, shortness of breath, muscle aches, profound fatigue, loss of sense of smell and taste, and diarrhea. Not every infected person will have symptoms which is why social distancing is imperative. Most people have only mild symptoms, but others have severe symptoms that require hospitalization and occasionally intensive care. Because you are a patient with a heart failure, you are at higher risk of getting very sick if you contract Coronavirus and, therefore, it is important to practice good hand hygiene, social distancing, and staying at home as much as possible. If you are experiencing symptoms of COVID-19, please call your primary healthcare clinician. For your safety and the safety of others, and to reduce potential exposures to the Coronavirus, please do not go to an urgent care clinic or emergency room for non-urgent or non-emergency issues unless you have been instructed to do so by your primary healthcare clinician. However, if you have life-threatening symptoms like difficulty breathing, call 911. We want to reduce the spread of the Coronavirus as much as possible and make sure our healthcare resources are not overwhelmed with non-urgent cases. You may have noticed that your healthcare clinicians have converted your in-person appointments to telephone or video calls, and some hospitals are not allowing visitors. The goal is to keep patients from taran the Coronavirus and to limit the number of healthcare workers in the hospital. If you are sick and need to be seen or get lab testing, you should still do so; otherwise, you can help by     1. Learning how to use your smartphone or computer to participate in telehealth video visits  2. Keeping track of missed visits and studies and working with your team to reschedule them once social distancing measures are relaxed     Also, do not stop any of your medications unless instructed by your healthcare team to do so. It is important that you have an adequate supply of your heart failure medications and that you request extended duration of supplies and refills from your providers during these times. We have a lot to learn about COVID-19 and currently there are several ongoing clinical trials to discover therapies that might help treat the infection and vaccines that can prevent the infection. The \[facility]\"" and other scientific institutions are working hard, with our patients in mind, to get through this pandemic as quickly as possible.      Finally, we recommend that you get your information from trusted, professional healthcare sources including [employer] like the [employer], federal agencies like the [employer] for [employer], and your local hospitals and health departments. Please access updated patient information on the [facility] [phone]) \A Chronology of [place] [facility]\"". We wish you safety and health during this challenging time.

## 2020-11-16 NOTE — PROGRESS NOTES
Contacted patient and provided LOLA Wilhelm NP dosing recommendation to hold warfarin dose Tues 11/17/20 and pt must call Dr. Alena Lopez office for further dosing instructions. Advised patient INR was 3.5. Offered Dr. Herrera Louis office number but patient said he had the number. Pt verbalized understanding of instructions and had no further questions. This writer will call Dr. Robert Wahl and advise of INR.   Bernardo Barton RN

## 2020-11-17 ENCOUNTER — TELEPHONE (OUTPATIENT)
Dept: CARDIOLOGY CLINIC | Age: 40
End: 2020-11-17

## 2020-11-17 DIAGNOSIS — I50.9 CONGESTIVE HEART FAILURE, UNSPECIFIED HF CHRONICITY, UNSPECIFIED HEART FAILURE TYPE (HCC): ICD-10-CM

## 2020-11-17 DIAGNOSIS — R06.02 SHORTNESS OF BREATH: ICD-10-CM

## 2020-11-17 RX ORDER — POTASSIUM CHLORIDE 20 MEQ/1
20 TABLET, EXTENDED RELEASE ORAL DAILY
Qty: 60 TAB | Refills: 2 | Status: SHIPPED | OUTPATIENT
Start: 2020-11-17 | End: 2021-02-19 | Stop reason: SDUPTHER

## 2020-11-17 RX ORDER — POTASSIUM CHLORIDE 20 MEQ/1
40 TABLET, EXTENDED RELEASE ORAL DAILY
Qty: 60 TAB | Refills: 2 | Status: SHIPPED | OUTPATIENT
Start: 2020-11-17 | End: 2020-11-17

## 2020-11-17 RX ORDER — FUROSEMIDE 40 MG/1
40 TABLET ORAL 2 TIMES DAILY
Qty: 45 TAB | Refills: 1 | Status: SHIPPED | OUTPATIENT
Start: 2020-11-17 | End: 2021-02-19 | Stop reason: SDUPTHER

## 2020-11-17 NOTE — TELEPHONE ENCOUNTER
Called and LVM for Dr. Tim Presley nurse, Sanjay Lozano, and advised pt's INR 3.5 on 11/16/2020 was instructed to hold Tuesday dose of warfarin (7.5 mg) and pt to follow up with Dr. Nory Hernandez for labs and dosing on 11/17/2020. JEREMY Petty

## 2020-11-17 NOTE — TELEPHONE ENCOUNTER
Clarified with LOLA Wilhelm NP who recommended V.O.R.B patient take 40 meq of potassium x3 days with lasix dose 80mg BID for 3 days then decrease potassium to 40 mEq daily with lasix 40mg BID. Updated prescriptions sent to pharmacy. Lab orders placed. Attempted to contact patient. Message left. Will await return call. Sneha Zazueta RN.

## 2020-11-17 NOTE — TELEPHONE ENCOUNTER
Labs reviewed from office visit 11/17. Notable for marked elevation in proBNP (17,242 from 5,125 on 10/22) and elevated tbili (2.3 from 1.1). Pt recently had interruption in lasix due to pt reported pharmacy issue. Will increase furosemide to 80 mg mg PO x 3 days, then decrease to 40 mg PO BID and then repeat CMP, Mg, and pro-BNP on Monday. Begin potassium chloride 40 mEq PO daily with increased furosemide dosing.

## 2020-11-18 NOTE — TELEPHONE ENCOUNTER
Attempted to contact patient at listed number. Spoke with woman who provided alternative number [phone]. Contacted patient. Name and date of birth verified. Informed patient of results and recommendations. Requested patient write down all information. He verified by read back. Patient requested lab orders be faxed to Dr. Kannan Haywood office and he will go for lab draw next week. Patient was encouraged to continue daily weights and BP. He was educated to strictly monitor sodium intake and not exceed 2,000mg daily. He was also educated on the importance of adequate hydration without overload with a fluid intake goal of 6-8 eight ounce glasses daily. Patient verbalized understanding and had no further questions. Lab orders faxed to Dr. Kannan Haywood office this date. Dre Schwartz RN.

## 2020-11-19 ENCOUNTER — TELEPHONE (OUTPATIENT)
Dept: CARDIOLOGY CLINIC | Age: 40
End: 2020-11-19

## 2020-11-19 NOTE — TELEPHONE ENCOUNTER
Patient called with complaint of increased HR, states BP this am prior to medication was 115/85, . He retook several times after taking medications, latest was 106/84, HR 97, 103/68, . Weight today is 165 lb. He denies dizziness or shortness of breath. He is taking hydralazine that was just started. He did admit to only drinking one cup of water today. I advised he drink 2 bottles of water now, aim for minimum of 48 ounces, max of 64 ounces, will send message to AKBAR Sims to advise further. I called patient, advised him per James Jay:  Have him drink fluids today, have him hold tonights hydralazine and see what his HR does   He states understanding, will call in am after taking medication.

## 2020-11-20 ENCOUNTER — TELEPHONE (OUTPATIENT)
Dept: CARDIOLOGY CLINIC | Age: 40
End: 2020-11-20

## 2020-11-20 NOTE — TELEPHONE ENCOUNTER
Called patient; verified ID with two identifers. Patient states /85   Two hours post medication 111/82 . Pt reports he has not taken hydralazine since yesterday. Will discuss with provider. Per Ashutosh Cook, NP continue to hold hydralazine and monitor BP and HR and call office Monday with readings. Patient advised of NP recommendations and he verbalized understanding.  Eusebia [place] RN

## 2020-11-20 NOTE — TELEPHONE ENCOUNTER
I called patient to check on his HR and BP-he was still sleeping-he will call back when he is awake.

## 2020-11-23 NOTE — TELEPHONE ENCOUNTER
I called patient, BP today is 118/85, , weight 166 lb. He is currently not taking hydralazine per request of AKBAR Sims. He does have dietary indiscretion, has been eating high sodium foods. We talked extensively about low sodium diet, getting baron on phone for monitoring sodium. Please advise, thank you. I called patient and he will call back with BP and HR after taking medications. I called patient, he states weight today 164 lb, /85,  prior to medication. 2 hours after medication: /71, . He states he drinks approx 4-5 bottles water during day. He denies chest pain, shortness of breath, palpitations. I called patient and advised him per Mercedes Ham:  Continue current course for now   I also advised him to closely watch sodium intake over thanksgiving. He states understanding.

## 2021-01-08 DIAGNOSIS — I50.9 CONGESTIVE HEART FAILURE, UNSPECIFIED HF CHRONICITY, UNSPECIFIED HEART FAILURE TYPE (HCC): ICD-10-CM

## 2021-01-10 RX ORDER — CARVEDILOL 3.12 MG/1
TABLET ORAL
Qty: 30 TAB | Refills: 0 | Status: SHIPPED | OUTPATIENT
Start: 2021-01-10 | End: 2021-02-19 | Stop reason: SDUPTHER

## 2021-01-21 ENCOUNTER — ANCILLARY PROCEDURE (OUTPATIENT)
Dept: CARDIOLOGY CLINIC | Age: 41
End: 2021-01-21
Payer: MEDICAID

## 2021-01-21 DIAGNOSIS — E85.9 AMYLOIDOSIS, UNSPECIFIED TYPE (HCC): ICD-10-CM

## 2021-01-21 LAB
STRESS TARGET HR: 179 BPM
STRESS TARGET HR: 179 BPM

## 2021-01-21 PROCEDURE — 78803 RP LOCLZJ TUM SPECT 1 AREA: CPT | Performed by: INTERNAL MEDICINE

## 2021-01-21 PROCEDURE — 78800 RP LOCLZJ TUM 1 AREA 1 D IMG: CPT | Performed by: INTERNAL MEDICINE

## 2021-01-21 PROCEDURE — A9538 TC99M PYROPHOSPHATE: HCPCS | Performed by: INTERNAL MEDICINE

## 2021-02-10 ENCOUNTER — TELEPHONE (OUTPATIENT)
Dept: CARDIOLOGY CLINIC | Age: 41
End: 2021-02-10

## 2021-02-17 ENCOUNTER — HOSPITAL ENCOUNTER (OUTPATIENT)
Dept: NON INVASIVE DIAGNOSTICS | Age: 41
Discharge: HOME | End: 2021-02-17
Attending: NURSE PRACTITIONER
Payer: MEDICAID

## 2021-02-17 ENCOUNTER — TELEPHONE (OUTPATIENT)
Dept: CARDIOLOGY CLINIC | Age: 41
End: 2021-02-17

## 2021-02-17 ENCOUNTER — OFFICE VISIT (OUTPATIENT)
Dept: CARDIOLOGY CLINIC | Age: 41
End: 2021-02-17
Payer: MEDICAID

## 2021-02-17 VITALS
DIASTOLIC BLOOD PRESSURE: 68 MMHG | HEIGHT: 69 IN | WEIGHT: 176.37 LBS | SYSTOLIC BLOOD PRESSURE: 118 MMHG | BODY MASS INDEX: 26.12 KG/M2

## 2021-02-17 VITALS
SYSTOLIC BLOOD PRESSURE: 118 MMHG | TEMPERATURE: 98.4 F | HEIGHT: 69 IN | OXYGEN SATURATION: 98 % | DIASTOLIC BLOOD PRESSURE: 68 MMHG | BODY MASS INDEX: 26.33 KG/M2 | RESPIRATION RATE: 12 BRPM | WEIGHT: 177.8 LBS | HEART RATE: 66 BPM

## 2021-02-17 DIAGNOSIS — I50.9 CONGESTIVE HEART FAILURE, UNSPECIFIED HF CHRONICITY, UNSPECIFIED HEART FAILURE TYPE (HCC): ICD-10-CM

## 2021-02-17 DIAGNOSIS — E85.82 WILD-TYPE TRANSTHYRETIN-RELATED (ATTR) AMYLOIDOSIS (HCC): ICD-10-CM

## 2021-02-17 DIAGNOSIS — R06.02 SHORTNESS OF BREATH: ICD-10-CM

## 2021-02-17 DIAGNOSIS — E55.9 VITAMIN D DEFICIENCY: ICD-10-CM

## 2021-02-17 DIAGNOSIS — I50.9 STAGE C CHRONIC COMBINED CONGESTIVE HEART FAILURE (HCC): ICD-10-CM

## 2021-02-17 DIAGNOSIS — F12.90 MARIJUANA USE: ICD-10-CM

## 2021-02-17 DIAGNOSIS — R06.09 DOE (DYSPNEA ON EXERTION): ICD-10-CM

## 2021-02-17 DIAGNOSIS — M10.00 IDIOPATHIC GOUT, UNSPECIFIED CHRONICITY, UNSPECIFIED SITE: ICD-10-CM

## 2021-02-17 DIAGNOSIS — I50.20 NYHA CLASS 3 HEART FAILURE WITH REDUCED EJECTION FRACTION (HCC): Primary | ICD-10-CM

## 2021-02-17 LAB
ECHO AO ROOT DIAM: 3.39 CM
ECHO AV PEAK GRADIENT: 3.36 MMHG
ECHO AV PEAK VELOCITY: 91.63 CM/S
ECHO LV EDV A2C: 125.74 ML
ECHO LV EDV A4C: 141.85 ML
ECHO LV EDV BP: 138.48 ML (ref 67–155)
ECHO LV EDV INDEX A4C: 72.4 ML/M2
ECHO LV EDV INDEX BP: 70.7 ML/M2
ECHO LV EDV NDEX A2C: 64.2 ML/M2
ECHO LV EJECTION FRACTION A2C: 13 PERCENT
ECHO LV EJECTION FRACTION A4C: 28 PERCENT
ECHO LV EJECTION FRACTION BIPLANE: 20.6 PERCENT (ref 55–100)
ECHO LV ESV A2C: 109.98 ML
ECHO LV ESV A4C: 102.8 ML
ECHO LV ESV BP: 110.02 ML (ref 22–58)
ECHO LV ESV INDEX A2C: 56.2 ML/M2
ECHO LV ESV INDEX A4C: 52.5 ML/M2
ECHO LV ESV INDEX BP: 56.2 ML/M2
ECHO LV INTERNAL DIMENSION DIASTOLIC: 6.28 CM (ref 4.2–5.9)
ECHO LV INTERNAL DIMENSION SYSTOLIC: 5.39 CM
ECHO LV IVSD: 0.9 CM (ref 0.6–1)
ECHO LV MASS 2D: 249.9 G (ref 88–224)
ECHO LV MASS INDEX 2D: 127.6 G/M2 (ref 49–115)
ECHO LV POSTERIOR WALL DIASTOLIC: 1 CM (ref 0.6–1)
ECHO LVOT PEAK GRADIENT: 3.95 MMHG
ECHO LVOT PEAK VELOCITY: 99.4 CM/S
ECHO MV A VELOCITY: 39.32 CM/S
ECHO MV AREA PHT: 5.97 CM2
ECHO MV E DECELERATION TIME (DT): 126.97 MS
ECHO MV E VELOCITY: 90 CM/S
ECHO MV E/A RATIO: 2.29
ECHO MV PRESSURE HALF TIME (PHT): 36.82 MS
ECHO PV PEAK INSTANTANEOUS GRADIENT SYSTOLIC: 2.69 MMHG
ECHO PV REGURGITANT MAX VELOCITY: 170.69 CM/S
ECHO RV INTERNAL DIMENSION: 3.15 CM
ECHO RV TAPSE: 1.01 CM (ref 1.5–2)
ECHO TV REGURGITANT MAX VELOCITY: 209.07 CM/S
ECHO TV REGURGITANT MAX VELOCITY: 273.99 CM/S
ECHO TV REGURGITANT PEAK GRADIENT: 30.03 MMHG

## 2021-02-17 PROCEDURE — 74011000250 HC RX REV CODE- 250: Performed by: NURSE PRACTITIONER

## 2021-02-17 PROCEDURE — 94618 PULMONARY STRESS TESTING: CPT | Performed by: NURSE PRACTITIONER

## 2021-02-17 PROCEDURE — 74011250636 HC RX REV CODE- 250/636: Performed by: NURSE PRACTITIONER

## 2021-02-17 PROCEDURE — 93000 ELECTROCARDIOGRAM COMPLETE: CPT | Performed by: NURSE PRACTITIONER

## 2021-02-17 PROCEDURE — C8929 TTE W OR WO FOL WCON,DOPPLER: HCPCS

## 2021-02-17 PROCEDURE — 99214 OFFICE O/P EST MOD 30 MIN: CPT | Performed by: NURSE PRACTITIONER

## 2021-02-17 RX ADMIN — SODIUM CHLORIDE 1.5 ML: 9 INJECTION INTRAMUSCULAR; INTRAVENOUS; SUBCUTANEOUS at 15:00

## 2021-02-17 NOTE — PATIENT INSTRUCTIONS
Medication changes: 
 
Increase lasix to 40mg twice daily - call [facility] on Friday with weights Please take this to your pharmacy to notify them of the change in medications. Testing Ordered: 
 
Labs, EKG, 6 minute walk, echocardiogram, [facility] will notify you of any abnormal results Other Recommendations:  
  
Ensure your drinking an adequate amount of water with a goal of 6-8 eight ounce glasses (1.5-2 liters) of fluid daily. Your urine should be clear and light yellow straw colored. If your blood pressure begins to consistently run below 90/60 and/or you begin to experience dizziness or lightheadedness, please contact the [facility] at [phone]. Follow up one month with Edilberto Gardner1 Follow up with primary cardiologist  
 
Please monitor your weights daily upon waking and after using the bathroom. Keep a written records of your weights and bring to your next appointment. If you have a weight gain of 3 or more pounds overnight OR 5 or more pounds in one week please contact our office. Thank you for allowing us the privilege of being a part of your healthcare team! Please do not hesitate to contact our office at [phone] with any questions or concerns. Virtual Heart Failure Support Group [address] [place] [address] invites you to learn more about heart failure and to share your questions, ideas, and experiences with others. Each month, the Heart Failure Support Group features a new educational topic and a guest speaker, followed by an interactive discussion. Our Heart Failure Nurse Navigator will moderate each session. You will be able to participate by phone, tablet or computer through [address] [place] [address]. This support group takes place on the 3rd Thursday of each month from 6:00-7:30PM. All individuals living with heart failure and their caregivers are welcome to join. If you are interested in participating, please contact us at [email or web address] and you will be sent the link to join the [employer]. Learning About Heart Failure Zones What are heart failure zones? Heart failure zones give you an easy way to see changes in your heart failure symptoms. They also tell you when you need to get help. Check every day to see which zone you are in. Green zone. You are doing well. This is where you want to be. · Your weight is stable. It's not going up or down. · You breathe easily. · You are sleeping well. You are able to lie flat without shortness of breath. · You can do your usual activities. Yellow zone. Be careful. Your symptoms are changing. Call your doctor. · You have new or increased shortness of breath. · You are dizzy or lightheaded, or you feel like you may faint. · You have sudden weight gain, such as more than 2 to 3 pounds in a day or 5 pounds in a week. (Your doctor may suggest a different range of weight gain.) · You have increased swelling in your legs, ankles, or feet. · You are so tired or weak that you can't do your usual activities. · You are not sleeping well. Shortness of breath wakes you up at night. You need extra pillows. Red zone. This is an emergency. Call 911. You have symptoms of sudden heart failure. For example: · You have severe trouble breathing. · You cough up pink, foamy mucus. · You have a new irregular or fast heartbeat. You have symptoms of a heart attack. These may include: · Chest pain or pressure, or a strange feeling in the chest. 
· Sweating. · Shortness of breath. · Nausea or vomiting. · Pain, pressure, or a strange feeling in the back, neck, jaw, or upper belly or in one or both shoulders or arms. · Lightheadedness or sudden weakness. · A fast or irregular heartbeat. If you have symptoms of a heart attack: After you call 911, the  may tell you to chew 1 adult-strength or 2 to 4 low-dose aspirin. Wait for an ambulance. Do not try to drive yourself. Follow-up care is a key part of your treatment and safety. Be sure to make and go to all appointments, and call your doctor if you are having problems. It's also a good idea to know your test results and keep a list of the medicines you take. Low Sodium Diet (2,000 Milligram): Care Instructions Your Care Instructions Too much sodium causes your body to hold on to extra water. This can raise your blood pressure and force your heart and kidneys to work harder. In very serious cases, this could cause you to be put in the hospital. It might even be life-threatening. By limiting sodium, you will feel better and lower your risk of serious problems. The most common source of sodium is salt. People get most of the salt in their diet from canned, prepared, and packaged foods. Fast food and restaurant meals also are very high in sodium. Your doctor will probably limit your sodium to less than 2,000 milligrams (mg) a day. This limit counts all the sodium in prepared and packaged foods and any salt you add to your food. Follow-up care is a key part of your treatment and safety. Be sure to make and go to all appointments, and call your doctor if you are having problems. It's also a good idea to know your test results and keep a list of the medicines you take. How can you care for yourself at home? Read food labels · Read labels on cans and food packages. The labels tell you how much sodium is in each serving. Make sure that you look at the serving size. If you eat more than the serving size, you have eaten more sodium. · Food labels also tell you the Percent Daily Value for sodium. Choose products with low Percent Daily Values for sodium. · Be aware that sodium can come in forms other than salt, including monosodium glutamate (MSG), sodium citrate, and sodium bicarbonate (baking soda). MSG is often added to Asian food. When you eat out, you can sometimes ask for food without MSG or added salt. Buy low-sodium foods · Buy foods that are labeled \"unsalted\" (no salt added), \"sodium-free\" (less than 5 mg of sodium per serving), or \"low-sodium\" (less than 140 mg of sodium per serving). Foods labeled \"reduced-sodium\" and \"light sodium\" may still have too much sodium. Be sure to read the label to see how much sodium you are getting. · Buy fresh vegetables, or frozen vegetables without added sauces. Buy low-sodium versions of canned vegetables, soups, and other canned goods. Prepare low-sodium meals · Cut back on the amount of salt you use in cooking. This will help you adjust to the taste. Do not add salt after cooking. One teaspoon of salt has about 2,300 mg of sodium. · Take the salt shaker off the table. · Flavor your food with garlic, lemon juice, onion, vinegar, herbs, and spices. Do not use soy sauce, lite soy sauce, steak sauce, onion salt, garlic salt, celery salt, mustard, or ketchup on your food. · Use low-sodium salad dressings, sauces, and ketchup. Or make your own salad dressings and sauces without adding salt. · Use less salt (or none) when recipes call for it. You can often use half the salt a recipe calls for without losing flavor. Other foods such as rice, pasta, and grains do not need added salt. · Rinse canned vegetables, and cook them in fresh water. This removes somebut not allof the salt. · Avoid water that is naturally high in sodium or that has been treated with water softeners, which add sodium. Call your local water company to find out the sodium content of your water supply. If you buy bottled water, read the label and choose a sodium-free brand. Avoid high-sodium foods · Avoid eating: ? Smoked, cured, salted, and canned meat, fish, and poultry. ? Ham, fields, hot dogs, and luncheon meats. ? Regular, hard, and processed cheese and regular peanut butter. ? Crackers with salted tops, and other salted snack foods such as pretzels, chips, and salted popcorn. ? Frozen prepared meals, unless labeled low-sodium. ? Canned and dried soups, broths, and bouillon, unless labeled sodium-free or low-sodium. ? Canned vegetables, unless labeled sodium-free or low-sodium. ? [place] fries, pizza, tacos, and other fast foods. ? Pickles, olives, ketchup, and other condiments, especially soy sauce, unless labeled sodium-free or low-sodium. Where can you learn more? Go to http://sarahi-artemio.info/ Enter F508 in the search box to learn more about \"Low Sodium Diet (2,000 Milligram): Care Instructions. \" Current as of: August 22, 2019               Content Version: 12.6 © 1341-2148 [employer]. Care instructions adapted under license by [employer] (which disclaims liability or warranty for this information). If you have questions about a medical condition or this instruction, always ask your healthcare professional. [address]  any warranty or liability for your use of this information.

## 2021-02-17 NOTE — PROGRESS NOTES
[facility] in [place], [facility] Note    Patient name: Paula Gaston  Patient : 1980  Patient MRN: [id number]  Date of service: 21    Primary care physician: None  Primary general cardiologist:  Dr. Min Cee, NP  Primary [facility] cardiologist: Hakeem Christopher MD    CHIEF COMPLAINT:  Chronic systolic heart failure    PLAN:  · Patient with severe cardiomyopathy and advanced heart failure symptoms, unable to tolerate GDMT due to hypotension, if no recovery of LVEF we will initiate workup for heart transplant/LVAD - closest transplant site would be [facility] and we will facilitate referral if needed  · Importance of abstinence from substance use was discussed with patient and mother; patient will need 6 months period substance free to be considered for cardiac replacement therapy and formal enrollment in counseling and treatment program if addiction disorder was identified  · Will check TTE, EKG, 6MW, orthostatics today - based on these results will schedule CPET or RHC for further workup of his cardiomyopathy     Continue current medical therapy for heart failure  Continue current dose of coreg to 3.125mg twice daily due to hypotension/aTTR  Continue current dose of entresto one/half tablet of 24/26mg twice daily; no uptitration due to aTTR  Continue current dose of spironolactone 25mg daily  Increase lasix to 40mg po BID- weight today 177lbs, with audible bibasilar crackles  Discontinue hydralazine BP today 118/68mmHg  Continue allopurinol 100 mg PO daily, check uric acid level today  Chronic anticoagulation with coumadin, followed by primary cardiologist  Continue vitamin D, check level today  Does not take ASA or statin  Continue lifevest for at least 3 months or until resolution of LV thrombus before referral for ICD; Has not been wearing LifeVest for last week due to malfunction?  [facility] but delayed d/t inclement weather- Will contact Dong gupta re: issues with transmission of data  Schedule sleep study; prefer when off THC  Invitae positive for aTTR; d/w patient and Mother; information for genetic counseling provided  PYP negative for cardiac amyloid  Coxsackie ab and Parvo positive  Check labs: CBC, CMP, NTproBNP, Mag, iron profile, ferritin, uric acid, vitamin D  Schedule TTE today- evaluate LV thrombus with definity, and LVEF  Check EKG  Orthostatics- negative  6MW   Reinforced low salt diet - handout provided  Reinforced fluid restriction to 6 x 8oz glasses per day  Commanded on tobacco and alcohol abstinence  Counseled on importance of THC wean; concern re: addiction disorder and provided with counseling options; reported last use was on his birthday   Discussed importance of daily weights  [facility] previously provided patient with arm blood pressure cuff  Document in diary BP/HR and daily weights  Previously provided educational materials \"Living with heart failure\"   Previously provided advanced care plan forms to be filled out    Offerred nutritionist for HF diet  Follow-up with primary cardiologist  Recommend flu and pneumonia vaccinations  Provided patient education materials for COVID precautions  Obtain ischemic evaluation from referring MD; Dr. Ortiz Penaloza  Previously provided list of counselers who might have if patient has difficulty stopping THC- he has not contacted addiction counselors   Return to [address] [place] [address] in one month, follow up on TTE and lab results     IMPRESSION:  Fatigue  Shortness of breath  Volume overload, mild  Chronic systolic heart failure   Stage C, NYHA class IIIA symptoms   Patient diagnosed with heart failure 3 years ago (per patient report, no records)   Reportedly non-ischemic cardiomyopathy, LVEF 10%   H/o chronic troponin elevation   No record of C   LV thrombus 2.7 x 3cm (by echo 9/2020)  · H/o renal infarcts  · H/o splenic infarcts  · Chronic anticoagulation with coumadin  At risk for sudden cardiac death  · Lifevest  Cardiac risk factors   HTN   HL   Former tobacco use, 1.25 pack years, quit 2015   Alcohol use, 5-6 drinks >4 times weekly, quit 9/2020   Marijuana, daily  CKD, stage 3 (baseline Cr 2.3)  Bronchitis  Anxiety    CARDIAC IMAGING:  Echo (9/16/20) LVEF 10%, LVEDD 5.4cm, LV thrombus 2.7x 3cm, reduced RV function, mild PAH  EKG (9/19/20) sinus tachycardia 102bpm, QRS 95ms  LHC no records    HEMODYNAMICS:  RHC not done  CPEST not done    6 Min Walk Report 2/17/2021   (PRE) HR 70   (PRE) O2 Sat 98   (POST)    (POST) O2 Sat 96   Distance in Meters 369.75       FAMILY HISTORY:  Mother - hypertension  Father - stroke  Sister - no known problems  Maternal grandfather had heart failure requirin heart transplant  All of maternal grandfather's brothers and sisters with heart failure  Maternal cousin with heart failure  Maternal cousin with obesity     SOCIAL HISTORY:  Not working since 9/2020; applied for disability  Lives in [place], [place] - this is 2[place] hours from [place] and 2 hours from [facility], 2 biological children (one years old and 8years old, both healthy)  Former tobacco use, 1.25 pack years, quit 2015  Alcohol use, 5-6 drinks >4 times weekly, quit 9/2020  Marijuana, daily  Lives alone    HISTORY OF PRESENT ILLNESS:  I had the pleasure of seeing Fabián Renteria in [facility] at [facility] in [place]. Briefly, Fabián Renteria is a 42 y.o. male with h/o tobacco (quite 2015), alcohol (quit 9/2020) and THC use (daily) and strong family history of several members with early cardiomyopathy and cardiac deaths. Patient presents with chronic systolic heart failure, stage C, NYHA class IIIA symptoms, states was diagnosed with heart failure 3 years ago (per patient report, no records). He was off medications for several months.  Patient was then admitted with acute heart failure, reportedly non-ischemic cardiomyopathy, LVEF 10% (no record of [facility]), LV thrombus 2.7 x 3cm (by echo 9/2020) c/b splenic and renal infarcts started on chronic anticoagulation with coumadin. Discharged home with lifevest and referred to [facility] today for further evaluation and treatment of severe cardiomyopathy. Patient lives [place] hours from [place], he lives closer to [place] but for the next couple of months would like to follow with us. If needs heart transplant [facility] would be the closer site. INTERVAL HISTORY:  Today, Mr. Nevin Bhardwaj presents for follow up visit. Overall he feels better. He reports having more stamina since starting his cardiac medications. He was able to walk from the parking lot the [facility] clinic without having to stop and catch his breath. He is able to walk more than one block without symptoms of fatigue or shortness of breath. He can now walk one flight of stairs without fatigue and/or shortness of breath. He can perform home activities without problem. .     Mr. Nevin Bhardwaj denies symptoms of volume overload, leg edema, or abdominal bloating. He does not follow a low Na+ diet, consule ~60oz of fluid daily. He denies orthopnea, PND or significant nocturia. He denies irregular heart rate or palpitations. He is not wearing his Lifevest, states it is malfunctioning and was instructed by Marina Hernandez to mail it back but due to the snow/ice he has not been able to get to the post office. He denies other cardiac symptoms such as chest pain or leg pain with walking. Patient is compliant with fluid restriction and taking medications as prescribed. Patient manages his own medications. Reports no recent ETOH use. LAst THC use on 2/8/2021. Did not follow up on drug counseling, does not think it is necessary. He is asking for ED medications, viagra or cilias today. REVIEW OF SYSTEMS:  General: Denies fever, night sweats.   Ear, nose and throat: Denies difficulty hearing, sinus problems, runny nose, post-nasal drip, ringing in ears, mouth sores, loose teeth, ear pain, nosebleeds, sore throate, facial pain or numbess  Cardiovascular: see above in the interval history  Respiratory: Denies cough, wheezing, sputum production, hemoptysis.  Gastrointestinal: Denies heartburn, constipation, intolerance to certain foods, diarrhea, abdominal pain, nausea, vomiting, difficulty swallowing, blood in stool  Kidney and bladder: Denies painful urination, frequent urination, urgency, prostate problems and impotence  Musculoskeletal: Denies joint pain, muscle weakness  Skin and hair: Denies change in existing skin lesions, hair loss or increase, breast changes    PHYSICAL EXAM:  Visit Vitals  /68 (BP 1 Location: Left upper arm, BP Patient Position: Sitting, BP Cuff Size: Adult)   Pulse 66   Temp 98.4 °F (36.9 °C) (Oral)   Resp 12   Ht 5' 9\" (1.753 m)   Wt 177 lb 12.8 oz (80.6 kg)   SpO2 98%   BMI 26.26 kg/m²     General: Patient is well developed, well-nourished in no acute distress  HEENT: Normocephalic and atraumatic. No scleral icterus. Pupils are equal, round and reactive to light and accomodation. No conjunctival injection. Oropharynx is clear.   Neck: Supple. No evidence of thyroid enlargements or lymphadenopathy.  JVD: Cannot be appreciated   Lungs: Bibasilar crackles posterior,  No wheezes or rhonchi.  Heart: Regular rate and rhythm with normal S1 and S2. No murmurs, gallops or rubs.  Abdomen: Soft, no mass or tenderness. No organomegaly or hernia. Bowel sounds present.  Genitourinary and rectal: deferred  Extremities: No cyanosis, clubbing, or edema.  Neurologic: No focal sensory or motor deficits are noted. Grossly intact.  Psychiatric: Awake, alert and oriented x 3. Appropriate mood and affect.  Skin: Warm, dry and well perfused. No lesions, nodules or rashes are noted.    Current Outpatient Medications on File Prior to Visit   Medication Sig Dispense Refill   • carvediloL (COREG) 3.125 mg tablet TAKE 1 TABLET  BY MOUTH TWICE A DAY WITH MEALS (DOSE DECREASE) 30 Tab 0    furosemide (LASIX) 40 mg tablet Take 1 Tab by mouth two (2) times a day. May take an addition 40mg by mouth daily PRN as directed by [facility] 1721 (Patient taking differently: Take 40 mg by mouth two (2) times a day. (2/16/21-Taking 1 tab daily)May take an addition 40mg by mouth daily PRN as directed by Edilber[facility] Jaime 1721) 45 Tab 1    potassium chloride (K-DUR, KLOR-CON) 20 mEq tablet Take 1 Tab by mouth daily. May take an additional 20 mEq daily PRN as directed by Edil[facility] Jaime 1721. 60 Tab 2    pantoprazole (PROTONIX) 20 mg tablet Take 2 Tabs by mouth daily. 30 Tab 2    allopurinoL (ZYLOPRIM) 100 mg tablet Take 1 Tab by mouth daily. 30 Tab 5    sacubitriL-valsartan (Entresto) 24-26 mg tablet Take 0.5 Tabs by mouth two (2) times a day. Take 1/2 tab two times a day. 60 Tab 2    cholecalciferol (VITAMIN D3) (2,000 UNITS /50 MCG) cap capsule Take 2,000 Units by mouth daily. 30 Cap 3    warfarin (COUMADIN) 7.5 mg tablet Take 7.5 mg by mouth daily.  hydrALAZINE (APRESOLINE) 25 mg tablet Take 1 Tab by mouth three (3) times daily. (Patient taking differently: Take 25 mg by mouth three (3) times daily. (stopped 11/20)) 90 Tab 2    spironolactone (ALDACTONE) 25 mg tablet Take 1/2 tab daily 30 Tab 2     No current facility-administered medications on file prior to visit. Thank you for your referral and allowing me to participate in this patient's care.     KAMILLA Rowe  [address] [facility], [address]  Phone: [phone]  Fax: [phone]    Total visit time: 45 minutes (> 50% spent face-to-face counseling)

## 2021-02-18 LAB
25(OH)D3 SERPL-MCNC: 15 NG/ML (ref 30–100)
ALBUMIN SERPL-MCNC: 4.2 G/DL (ref 3.5–5)
ALBUMIN/GLOB SERPL: 1.1 {RATIO} (ref 1.1–2.2)
ALP SERPL-CCNC: 156 U/L (ref 45–117)
ALT SERPL-CCNC: 73 U/L (ref 12–78)
ANION GAP SERPL CALC-SCNC: 8 MMOL/L (ref 5–15)
AST SERPL-CCNC: 78 U/L (ref 15–37)
BILIRUB SERPL-MCNC: 0.7 MG/DL (ref 0.2–1)
BNP SERPL-MCNC: 1267 PG/ML
BUN SERPL-MCNC: 13 MG/DL (ref 6–20)
BUN/CREAT SERPL: 10 (ref 12–20)
CALCIUM SERPL-MCNC: 9.6 MG/DL (ref 8.5–10.1)
CHLORIDE SERPL-SCNC: 105 MMOL/L (ref 97–108)
CO2 SERPL-SCNC: 24 MMOL/L (ref 21–32)
CREAT SERPL-MCNC: 1.3 MG/DL (ref 0.7–1.3)
ERYTHROCYTE [DISTWIDTH] IN BLOOD BY AUTOMATED COUNT: 17 % (ref 11.5–14.5)
FERRITIN SERPL-MCNC: 200 NG/ML (ref 26–388)
GLOBULIN SER CALC-MCNC: 3.7 G/DL (ref 2–4)
GLUCOSE SERPL-MCNC: 80 MG/DL (ref 65–100)
HCT VFR BLD AUTO: 45.4 % (ref 36.6–50.3)
HGB BLD-MCNC: 14.3 G/DL (ref 12.1–17)
IRON SATN MFR SERPL: 28 % (ref 20–50)
IRON SERPL-MCNC: 91 UG/DL (ref 35–150)
MCH RBC QN AUTO: 29.6 PG (ref 26–34)
MCHC RBC AUTO-ENTMCNC: 31.5 G/DL (ref 30–36.5)
MCV RBC AUTO: 94 FL (ref 80–99)
NRBC # BLD: 0 K/UL (ref 0–0.01)
NRBC BLD-RTO: 0 PER 100 WBC
PLATELET # BLD AUTO: 231 K/UL (ref 150–400)
PMV BLD AUTO: 12 FL (ref 8.9–12.9)
POTASSIUM SERPL-SCNC: 4.3 MMOL/L (ref 3.5–5.1)
PROT SERPL-MCNC: 7.9 G/DL (ref 6.4–8.2)
RBC # BLD AUTO: 4.83 M/UL (ref 4.1–5.7)
SODIUM SERPL-SCNC: 137 MMOL/L (ref 136–145)
TIBC SERPL-MCNC: 328 UG/DL (ref 250–450)
URATE SERPL-MCNC: 7.6 MG/DL (ref 3.5–7.2)
WBC # BLD AUTO: 5.4 K/UL (ref 4.1–11.1)

## 2021-02-19 ENCOUNTER — TELEPHONE (OUTPATIENT)
Dept: CARDIOLOGY CLINIC | Age: 41
End: 2021-02-19

## 2021-02-19 DIAGNOSIS — E55.9 VITAMIN D DEFICIENCY: Primary | ICD-10-CM

## 2021-02-19 DIAGNOSIS — I50.9 CONGESTIVE HEART FAILURE, UNSPECIFIED HF CHRONICITY, UNSPECIFIED HEART FAILURE TYPE (HCC): ICD-10-CM

## 2021-02-19 DIAGNOSIS — R06.02 SHORTNESS OF BREATH: ICD-10-CM

## 2021-02-19 RX ORDER — PANTOPRAZOLE SODIUM 20 MG/1
40 TABLET, DELAYED RELEASE ORAL DAILY
Qty: 60 TAB | Refills: 2 | Status: SHIPPED | OUTPATIENT
Start: 2021-02-19 | End: 2021-08-19

## 2021-02-19 RX ORDER — FUROSEMIDE 40 MG/1
40 TABLET ORAL 2 TIMES DAILY
Qty: 90 TAB | Refills: 1 | Status: SHIPPED | OUTPATIENT
Start: 2021-02-19 | End: 2021-05-11

## 2021-02-19 RX ORDER — POTASSIUM CHLORIDE 20 MEQ/1
20 TABLET, EXTENDED RELEASE ORAL DAILY
Qty: 60 TAB | Refills: 2 | Status: SHIPPED | OUTPATIENT
Start: 2021-02-19 | End: 2021-06-17 | Stop reason: SDUPTHER

## 2021-02-19 RX ORDER — ERGOCALCIFEROL 1.25 MG/1
50000 CAPSULE ORAL
Qty: 12 CAP | Refills: 0 | Status: SHIPPED | OUTPATIENT
Start: 2021-02-19 | End: 2021-06-17 | Stop reason: ALTCHOICE

## 2021-02-19 RX ORDER — CARVEDILOL 3.12 MG/1
3.12 TABLET ORAL 2 TIMES DAILY WITH MEALS
Qty: 60 TAB | Refills: 1 | Status: SHIPPED | OUTPATIENT
Start: 2021-02-19 | End: 2021-05-11

## 2021-02-19 NOTE — TELEPHONE ENCOUNTER
Sona Lerma with [facility] is calling regarding a new medication for patient .     He can be reached at #[phone]

## 2021-02-19 NOTE — TELEPHONE ENCOUNTER
I returned call to clifford at  [facility], refilled all meds that he states need refilling:  Requested Prescriptions     Signed Prescriptions Disp Refills    carvediloL (COREG) 3.125 mg tablet 60 Tab 1     Sig: Take 1 Tab by mouth two (2) times daily (with meals). Authorizing Provider: Danni Jackson User: Linard Maxon furosemide (LASIX) 40 mg tablet 90 Tab 1     Sig: Take 1 Tab by mouth two (2) times a day. May take an addition 40mg by mouth daily PRN as directed by [facility] Provider: Danni Jackson User: Claudia Elliottamber    pantoprazole (PROTONIX) 20 mg tablet 60 Tab 2     Sig: Take 2 Tabs by mouth daily. Authorizing Provider: Danni Jackson User: Linard Maxon potassium chloride (K-DUR, KLOR-CON) 20 mEq tablet 60 Tab 2     Sig: Take 1 Tab by mouth daily. May take an additional 20 mEq daily PRN as directed by [facility] [id number].      Authorizing Provider: Danni Jackson User: Donisa Earle     Patient also called in his weights and BP for past 2 days:  Thursday:  Weight 175 lb, /85  Friday: weight 175, /90

## 2021-02-19 NOTE — TELEPHONE ENCOUNTER
His lasix can be decreased to 40mg daily- creatinine looks much better on recent labs at 1. 3! His vitamin D is also low and he can start ergocalciferol [id number] weekly x 12 weeks. Stop 2000iu daily. Called patient and LM requesting return call.  Alberto Samaniego RN

## 2021-02-22 ENCOUNTER — TELEPHONE (OUTPATIENT)
Dept: CARDIOLOGY CLINIC | Age: 41
End: 2021-02-22

## 2021-02-22 NOTE — TELEPHONE ENCOUNTER
Per MILA Fregoso NP: His lasix can be decreased to 40mg daily- creatinine looks much better on recent labs at 1. 3! His vitamin D is also low and he can start ergocalciferol [id number] weekly x 12 weeks. Called patient and LM for patient to return call.  Ricky Zapata RN

## 2021-02-23 NOTE — TELEPHONE ENCOUNTER
Called patient to advise of NP recommendations and unable to leave message as voice mail box is full.  Antwan Jones RN

## 2021-02-25 NOTE — TELEPHONE ENCOUNTER
Attempted to contact patient again with no success. Unable to leave message as voice mail box is full.  Eleanor Moura, RN

## 2021-03-01 ENCOUNTER — TELEPHONE (OUTPATIENT)
Dept: CARDIOLOGY CLINIC | Age: 41
End: 2021-03-01

## 2021-03-01 NOTE — TELEPHONE ENCOUNTER
Per MILA FregosoNP: His lasix can be decreased to 40mg daily- creatinine looks much better on recent labs at 1. 3! His vitamin D is also low and he can start ergocalciferol [id number] weekly x 12 weeks    Called again and LM requesting return call to discuss labs and NP recommendations.  Jacoby Caceres RN

## 2021-03-17 ENCOUNTER — OFFICE VISIT (OUTPATIENT)
Dept: CARDIOLOGY CLINIC | Age: 41
End: 2021-03-17
Payer: MEDICAID

## 2021-03-17 VITALS
HEART RATE: 76 BPM | OXYGEN SATURATION: 98 % | DIASTOLIC BLOOD PRESSURE: 78 MMHG | HEIGHT: 69 IN | WEIGHT: 177.2 LBS | BODY MASS INDEX: 26.25 KG/M2 | SYSTOLIC BLOOD PRESSURE: 108 MMHG | TEMPERATURE: 98.7 F | RESPIRATION RATE: 12 BRPM

## 2021-03-17 DIAGNOSIS — I50.20 NYHA CLASS 3 HEART FAILURE WITH REDUCED EJECTION FRACTION (HCC): ICD-10-CM

## 2021-03-17 DIAGNOSIS — F12.90 MARIJUANA USE: ICD-10-CM

## 2021-03-17 DIAGNOSIS — Z20.828 PARVOVIRUS EXPOSURE: ICD-10-CM

## 2021-03-17 DIAGNOSIS — E55.9 VITAMIN D DEFICIENCY: ICD-10-CM

## 2021-03-17 DIAGNOSIS — R06.09 DOE (DYSPNEA ON EXERTION): Primary | ICD-10-CM

## 2021-03-17 DIAGNOSIS — E85.82 WILD-TYPE TRANSTHYRETIN-RELATED (ATTR) AMYLOIDOSIS (HCC): ICD-10-CM

## 2021-03-17 DIAGNOSIS — E85.9 AMYLOIDOSIS, UNSPECIFIED TYPE (HCC): ICD-10-CM

## 2021-03-17 PROCEDURE — 99214 OFFICE O/P EST MOD 30 MIN: CPT | Performed by: NURSE PRACTITIONER

## 2021-03-17 PROCEDURE — 94618 PULMONARY STRESS TESTING: CPT | Performed by: NURSE PRACTITIONER

## 2021-03-17 NOTE — PROGRESS NOTES
[facility] in [place], [facility] Note    Patient name: Nicki Garza  Patient : 1980  Patient MRN: [id number]  Date of service: 21    Primary care physician: None  Primary general cardiologist:  Dr. Liliana Orr, NP  Primary [facility] cardiologist: Jerome Carlton MD    CHIEF COMPLAINT:  Chief Complaint   Patient presents with    Follow-up       PLAN:  · Patient with severe cardiomyopathy and advanced heart failure symptoms, unable to tolerate GDMT due to hypotension, if no recovery of LVEF we will initiate workup for heart transplant/LVAD - closest transplant site would be [facility] and we will facilitate referral if needed  · Importance of abstinence from substance use was previously discussed with patient and mother; Jann Gurrola will need 6 months period substance free to be considered for cardiac replacement therapy and formal enrollment in counseling and treatment program if addiction disorder was identified. Will check UDS monthly starting today. · Will check 6MW and cMRI based on these results will schedule RHC for further workup of his cardiomyopathy   · Based on cMRI will determine if he can stop/continue anticoagulation, will require ICD placement or be too ill for ICD placment    Continue current medical therapy for heart failure  Continue current dose of coreg to 3.125mg twice daily due to hypotension/aTTR  Continue current dose of entresto one/half tablet of 24/26mg twice daily; no uptitration due to aTTR  Continue current dose of spironolactone 25mg daily  Continue lasix 40mg po BID  Continue allopurinol 100 mg PO daily, uric acid level 7.6  Chronic anticoagulation with coumadin, followed by primary cardiologist  Continue ergocalciferol, Vitamin D level 15  Does not take ASA or statin  Patient returned lifevest prior to last clinic visit;  [facility] re: issues with transmission of data/date Lindat returned  Schedule sleep study; prefer when off THC  Invitae positive for aTTR; previously d/w patient and Mother; information for genetic counseling provided  PYP negative for cardiac amyloid  Coxsackie ab and Parvo positive, check Parvo PCR   Check labs: CBC, CMP, NTproBNP, Mag, iron profile, ferritin, uric acid, vitamin D  TTE (2/17/21) LVEF 21%, no mention of LV thrombus  Schedule cMRI - this will help determine need for anticoagulation, ICD placement- thrombus   Check EKG  Orthostatics- negative  Check 6MW   Reinforced low salt diet - handout previously provided  Reinforced fluid restriction to 6 x 8oz glasses per day  Commanded on tobacco and alcohol abstinence  Counseled on importance of THC wean; concern re: addiction disorder and provided with counseling options; reported last use was on his birthday; check UDS monthly starting today  Discussed importance of daily weights  [facility] previously provided patient with arm blood pressure cuff  Document in diary BP/HR and daily weights  Previously provided educational materials \"Living with heart failure\"   Previously provided advanced care plan forms to be filled out    Offerred nutritionist for HF diet  Follow-up with primary cardiologist  Recommend flu, PNA, COVID vaccinations  Provided patient education materials for COVID precautions  Obtain ischemic evaluation from referring MD; Dr. Ortiz Penaloza - scanned in media  Previously provided list of counselers who might have if patient has difficulty stopping THC- he has not contacted addiction counselors   Return to [address] in 6 weeks     IMPRESSION:  Fatigue  Shortness of breath  Volume overload, mild  Chronic systolic heart failure   Stage C, NYHA class IIIA symptoms   Patient diagnosed with heart failure 3 years ago (per patient report, no records)   Reportedly non-ischemic cardiomyopathy, LVEF 10%   H/o chronic troponin elevation   No record of C   LV thrombus 2.7 x 3cm (by echo 9/2020)  · H/o renal infarcts  · H/o splenic infarcts  · Chronic anticoagulation with coumadin  At risk for sudden cardiac death  · Lifevest  Cardiac risk factors   HTN   HL   Former tobacco use, 1.25 pack years, quit 2015   Alcohol use, 5-6 drinks >4 times weekly, quit 9/2020   Marijuana, daily  CKD, stage 3 (baseline Cr 2.3)  Bronchitis  Anxiety    CARDIAC IMAGING:  Echo (9/16/20) LVEF 10%, LVEDD 5.4cm, LV thrombus 2.7x 3cm, reduced RV function, mild PAH  EKG (9/19/20) sinus tachycardia 102bpm, QRS 95ms  LHC no records    HEMODYNAMICS:  RHC not done  CPEST not done    6 Min Walk Report 3/17/2021 2/17/2021   (PRE) HR 88 70   (PRE) O2 Sat 98 98   (POST) HR 94 108   (POST) O2 Sat 85 96   Distance in Meters 362.1 369.75       FAMILY HISTORY:  Mother - hypertension  Father - stroke  Sister - no known problems  Maternal grandfather had heart failure requirin heart transplant  All of maternal grandfather's brothers and sisters with heart failure  Maternal cousin with heart failure  Maternal cousin with obesity     SOCIAL HISTORY:  Not working since 9/2020; applied for disability  Lives in [place], [place] - this is 2[place]5 hours from [place] and 2 hours from [facility], 2 biological children (one years old and 8years old, both healthy)  Former tobacco use, 1.25 pack years, quit 2015  Alcohol use, 5-6 drinks >4 times weekly, quit 9/2020  Marijuana, daily  Lives alone    HISTORY OF PRESENT ILLNESS:  I had the pleasure of seeing Danette Carrasquillo in [facility] at [address] [place] [address] in [place]. Briefly, Danette Carrasquillo is a 42 y.o. male with h/o tobacco (quite 2015), alcohol (quit 9/2020) and THC use (daily) and strong family history of several members with early cardiomyopathy and cardiac deaths. Patient presents with chronic systolic heart failure, stage C, NYHA class IIIA symptoms, states was diagnosed with heart failure 3 years ago (per patient report, no records).  He was off medications for several months. Patient was then admitted with acute heart failure, reportedly non-ischemic cardiomyopathy, LVEF 10% (no record of [facility]), LV thrombus 2.7 x 3cm (by echo 9/2020) c/b splenic and renal infarcts started on chronic anticoagulation with coumadin. Discharged home with lifevest and referred to [facility] today for further evaluation and treatment of severe cardiomyopathy. Patient lives [place] hours from [place], he lives closer to [place] but for the next couple of months would like to follow with us. If needs heart transplant [facility] would be the closer site. INTERVAL HISTORY:  Today, Mr. Heidy Lea presents for follow up visit. He fell of his deck this weekend, the step broke and he fell on the left side of his ribs. He is having significant pain in that area but declines any workup, or chest xray. Denies chest pain, SAAVEDRA, SOB, dizziness, lightheadedness, presyncope, syncope, or edema. He was able to walk from the parking lot the [facility] clinic without having to stop and catch his breath. He is able to walk more than one block without symptoms of fatigue or shortness of breath. He can now walk one flight of stairs without fatigue and/or shortness of breath. He can perform home activities without problem. Mr. Heidy Lea denies symptoms of volume overload, leg edema, or abdominal bloating. He is trying to follow a low Na+ diet, a fluid restriction. He denies orthopnea, PND or significant nocturia. He has not had sleep study. He denies irregular heart rate or palpitations. He returned his LifeVest since last clinic visit due to Vest malfunctions. He denies leg pain with walking. Patient is compliant with taking medications as prescribed. Patient manages his own medications. Reports no recent ETOH use. Last THC use on 2/8/2021. Did not follow up on drug counseling, does not think it is necessary. REVIEW OF SYSTEMS:  General: Denies fever, night sweats.    Ear, nose and throat: Denies difficulty hearing, sinus problems, runny nose, post-nasal drip, ringing in ears, mouth sores, loose teeth, ear pain, nosebleeds, sore throate, facial pain or numbess  Cardiovascular: see above in the interval history  Respiratory: Denies cough, wheezing, sputum production, hemoptysis. Gastrointestinal: Denies heartburn, constipation, intolerance to certain foods, diarrhea, abdominal pain, nausea, vomiting, difficulty swallowing, blood in stool  Kidney and bladder: Denies painful urination, frequent urination, urgency, prostate problems and impotence  Musculoskeletal: Pain along left side/lower ribcage, denies muscle weakness  Skin and hair: Denies change in existing skin lesions, hair loss or increase, breast changes    PHYSICAL EXAM:  Visit Vitals  /78 (BP 1 Location: Left arm, BP Patient Position: Sitting, BP Cuff Size: Adult)   Pulse 76   Temp 98.7 °F (37.1 °C) (Oral)   Resp 12   Ht 5' 9\" (1.753 m)   Wt 177 lb 3.2 oz (80.4 kg)   SpO2 98%   BMI 26.17 kg/m²     General: Patient is well developed, well-nourished, looks uncomfortable, holding left side  HEENT: Normocephalic and atraumatic. No scleral icterus. Pupils are equal, round and reactive to light and accomodation. No conjunctival injection. Oropharynx is clear. Neck: Supple. No evidence of thyroid enlargements or lymphadenopathy. JVD: Cannot be appreciated   Lungs: lungs clear throughout,  No wheezes or rhonchi, or rales  Heart: Regular rate and rhythm with normal S1 and S2. No murmurs, gallops or rubs. Abdomen: Soft, no mass or tenderness. No organomegaly or hernia. Bowel sounds present. Genitourinary and rectal: deferred  Extremities: No cyanosis, clubbing, or edema. Neurologic: No focal sensory or motor deficits are noted. Grossly intact. Psychiatric: Awake, alert and oriented x 3. Appropriate mood and affect. Skin: Warm, dry and well perfused. No lesions, nodules or rashes are noted.     Current Outpatient Medications on File Prior to Visit   Medication Sig Dispense Refill    carvediloL (COREG) 3.125 mg tablet Take 1 Tab by mouth two (2) times daily (with meals). 60 Tab 1    furosemide (LASIX) 40 mg tablet Take 1 Tab by mouth two (2) times a day. May take an addition 40mg by mouth daily PRN as directed by [facility] 1721 90 Tab 1    pantoprazole (PROTONIX) 20 mg tablet Take 2 Tabs by mouth daily. 60 Tab 2    potassium chloride (K-DUR, KLOR-CON) 20 mEq tablet Take 1 Tab by mouth daily. May take an additional 20 mEq daily PRN as directed by Edilberto Jaime 1721. 60 Tab 2    ergocalciferol (ERGOCALCIFEROL) 1,250 mcg (50,000 unit) capsule Take 1 Cap by mouth every seven (7) days. 12 Cap 0    allopurinoL (ZYLOPRIM) 100 mg tablet Take 1 Tab by mouth daily. 30 Tab 5    spironolactone (ALDACTONE) 25 mg tablet Take 1/2 tab daily 30 Tab 2    sacubitriL-valsartan (Entresto) 24-26 mg tablet Take 0.5 Tabs by mouth two (2) times a day. Take 1/2 tab two times a day. 60 Tab 2    warfarin (COUMADIN) 7.5 mg tablet Take 7.5 mg by mouth daily. No current facility-administered medications on file prior to visit. Thank you for your referral and allowing me to participate in this patient's care.     Kaylah Manning NP  [address] [place] [address], [address]  Phone: [phone]  Fax: [phone]    Total visit time: 30 minutes (> 50% spent face-to-face counseling)

## 2021-03-17 NOTE — LETTER
3/17/2021 Patient: Brian Patterson YOB: 1980 Date of Visit: 3/17/2021 Golden Greenfield MD 
[address] [place] [address] [address] [place] [id number] [address] [employer] Dear Golden Greenfield MD, Thank you for referring Mr. Brian Patterson to [address] [place] [address] for evaluation. My notes for this consultation are attached. If you have questions, please do not hesitate to call me. I look forward to following your patient along with you. Sincerely, Louise Borrego NP

## 2021-03-17 NOTE — PATIENT INSTRUCTIONS
Medication changes: 
 Avoid NSAIDS- Advil, Ibuprofen, Aleve, Naproxen sodium with heart failure. You can take Tylenol as needed and use 4% Lidocaine patch every 12-24 hours as needed for pain relief. also alternate heat and cold as needed Please take this to your pharmacy to notify them of the change in medications. Testing Ordered: 
 
Labs and 6 minute walk, [facility] will notify you with any abnormal results An order for Cardiac MRI has been placed to be done within the next 2 weeks. You will be receiving an automated call from Coordination of Care to schedule this test. If you are unavailable to receive the call or would like to contact coordination of care yourself you may contact [phone] to schedule. You will need to contact coordination of care yourself if you miss their calls as they will only make 3 attempts to reach you. Other Recommendations:  
  
Ensure your drinking an adequate amount of water with a goal of 6-8 eight ounce glasses (1.5-2 liters) of fluid daily. Your urine should be clear and light yellow straw colored. If your blood pressure begins to consistently run below 90/60 and/or you begin to experience dizziness or lightheadedness, please contact the [facility] [address] [employer] at [phone]. Follow up 6-8 weeks with [facility] 17[address] Referral to Sleep medicine for sleep study Please monitor your weights daily upon waking and after using the bathroom. Keep a written records of your weights and bring to your next appointment. If you have a weight gain of 3 or more pounds overnight OR 5 or more pounds in one week please contact our office. Thank you for allowing us the privilege of being a part of your healthcare team! Please do not hesitate to contact our office at [phone] with any questions or concerns. Virtual Heart Failure Support Group [address] invites you to learn more about heart failure and to share your questions, ideas, and experiences with others. Each month, the Heart Failure Support Group features a new educational topic and a guest speaker, followed by an interactive discussion. Our Heart Failure Nurse Navigator will moderate each session. You will be able to participate by phone, tablet or computer through [address] [place] [address]. This support group takes place on the 3rd Thursday of each month from 6:00-7:30PM. All individuals living with heart failure and their caregivers are welcome to join. If you are interested in participating, please contact us at [email or web address] and you will be sent the link to join the [employer]. DASH Diet: Care Instructions Your Care Instructions The DASH diet is an eating plan that can help lower your blood pressure. DASH stands for Dietary Approaches to Stop Hypertension. Hypertension is high blood pressure. The DASH diet focuses on eating foods that are high in calcium, potassium, and magnesium. These nutrients can lower blood pressure. The foods that are highest in these nutrients are fruits, vegetables, low-fat dairy products, nuts, seeds, and legumes. But taking calcium, potassium, and magnesium supplements instead of eating foods that are high in those nutrients does not have the same effect. The DASH diet also includes whole grains, fish, and poultry. The DASH diet is one of several lifestyle changes your doctor may recommend to lower your high blood pressure. Your doctor may also want you to decrease the amount of sodium in your diet. Lowering sodium while following the DASH diet can lower blood pressure even further than just the DASH diet alone. Follow-up care is a key part of your treatment and safety. Be sure to make and go to all appointments, and call your doctor if you are having problems. It's also a good idea to know your test results and keep a list of the medicines you take. How can you care for yourself at home? Following the DASH diet · Eat 4 to 5 servings of fruit each day. A serving is 1 medium-sized piece of fruit, ½ cup chopped or canned fruit, 1/4 cup dried fruit, or 4 ounces (½ cup) of fruit juice. Choose fruit more often than fruit juice. · Eat 4 to 5 servings of vegetables each day. A serving is 1 cup of lettuce or raw leafy vegetables, ½ cup of chopped or cooked vegetables, or 4 ounces (½ cup) of vegetable juice. Choose vegetables more often than vegetable juice. · Get 2 to 3 servings of low-fat and fat-free dairy each day. A serving is 8 ounces of milk, 1 cup of yogurt, or 1 ½ ounces of cheese. · Eat 6 to 8 servings of grains each day. A serving is 1 slice of bread, 1 ounce of dry cereal, or ½ cup of cooked rice, pasta, or cooked cereal. Try to choose whole-grain products as much as possible. · Limit lean meat, poultry, and fish to 2 servings each day. A serving is 3 ounces, about the size of a deck of cards. · Eat 4 to 5 servings of nuts, seeds, and legumes (cooked dried beans, lentils, and split peas) each week. A serving is 1/3 cup of nuts, 2 tablespoons of seeds, or ½ cup of cooked beans or peas. · Limit fats and oils to 2 to 3 servings each day. A serving is 1 teaspoon of vegetable oil or 2 tablespoons of salad dressing. · Limit sweets and added sugars to 5 servings or less a week. A serving is 1 tablespoon jelly or jam, ½ cup sorbet, or 1 cup of lemonade. · Eat less than 2,300 milligrams (mg) of sodium a day. If you limit your sodium to 1,500 mg a day, you can lower your blood pressure even more. Tips for success · Start small. Do not try to make dramatic changes to your diet all at once. You might feel that you are missing out on your favorite foods and then be more likely to not follow the plan. Make small changes, and stick with them. Once those changes become habit, add a few more changes. · Try some of the following: ? Make it a goal to eat a fruit or vegetable at every meal and at snacks.  This will make it easy to get the recommended amount of fruits and vegetables each day. ? Try yogurt topped with fruit and nuts for a snack or healthy dessert. ? Add lettuce, tomato, cucumber, and onion to sandwiches. ? Combine a ready-made pizza crust with low-fat mozzarella cheese and lots of vegetable toppings. Try using tomatoes, squash, spinach, broccoli, carrots, cauliflower, and onions. ? Have a variety of cut-up vegetables with a low-fat dip as an appetizer instead of chips and dip. ? Sprinkle sunflower seeds or chopped almonds over salads. Or try adding chopped walnuts or almonds to cooked vegetables. ? Try some vegetarian meals using beans and peas. Add garbanzo or kidney beans to salads. Make burritos and tacos with mashed woods beans or black beans. Where can you learn more? Go to http://www.gray.com/ Enter J920 in the search box to learn more about \"DASH Diet: Care Instructions. \" Current as of: December 16, 2019               Content Version: 12.6 © 7820-7265 [employer]. Care instructions adapted under license by [employer] (which disclaims liability or warranty for this information). If you have questions about a medical condition or this instruction, always ask your healthcare professional. [address] any warranty or liability for your use of this information.

## 2021-03-18 ENCOUNTER — TELEPHONE (OUTPATIENT)
Dept: CARDIOLOGY CLINIC | Age: 41
End: 2021-03-18

## 2021-03-18 LAB
ALBUMIN SERPL-MCNC: 4.1 G/DL (ref 3.5–5)
ALBUMIN/GLOB SERPL: 1.2 {RATIO} (ref 1.1–2.2)
ALP SERPL-CCNC: 158 U/L (ref 45–117)
ALT SERPL-CCNC: 66 U/L (ref 12–78)
ANION GAP SERPL CALC-SCNC: 11 MMOL/L (ref 5–15)
AST SERPL-CCNC: 89 U/L (ref 15–37)
BILIRUB SERPL-MCNC: 0.4 MG/DL (ref 0.2–1)
BNP SERPL-MCNC: 1105 PG/ML
BUN SERPL-MCNC: 12 MG/DL (ref 6–20)
BUN/CREAT SERPL: 10 (ref 12–20)
CALCIUM SERPL-MCNC: 9 MG/DL (ref 8.5–10.1)
CHLORIDE SERPL-SCNC: 107 MMOL/L (ref 97–108)
CO2 SERPL-SCNC: 21 MMOL/L (ref 21–32)
CREAT SERPL-MCNC: 1.23 MG/DL (ref 0.7–1.3)
ERYTHROCYTE [DISTWIDTH] IN BLOOD BY AUTOMATED COUNT: 14.8 % (ref 11.5–14.5)
GLOBULIN SER CALC-MCNC: 3.5 G/DL (ref 2–4)
GLUCOSE SERPL-MCNC: 81 MG/DL (ref 65–100)
HCT VFR BLD AUTO: 41.8 % (ref 36.6–50.3)
HGB BLD-MCNC: 13.5 G/DL (ref 12.1–17)
MAGNESIUM SERPL-MCNC: 1.9 MG/DL (ref 1.6–2.4)
MCH RBC QN AUTO: 30.5 PG (ref 26–34)
MCHC RBC AUTO-ENTMCNC: 32.3 G/DL (ref 30–36.5)
MCV RBC AUTO: 94.4 FL (ref 80–99)
NRBC # BLD: 0 K/UL (ref 0–0.01)
NRBC BLD-RTO: 0 PER 100 WBC
PLATELET # BLD AUTO: 241 K/UL (ref 150–400)
PMV BLD AUTO: 11.8 FL (ref 8.9–12.9)
POTASSIUM SERPL-SCNC: 4.2 MMOL/L (ref 3.5–5.1)
PROT SERPL-MCNC: 7.6 G/DL (ref 6.4–8.2)
RBC # BLD AUTO: 4.43 M/UL (ref 4.1–5.7)
SODIUM SERPL-SCNC: 139 MMOL/L (ref 136–145)
WBC # BLD AUTO: 5.2 K/UL (ref 4.1–11.1)

## 2021-03-18 RX ORDER — LANOLIN ALCOHOL/MO/W.PET/CERES
400 CREAM (GRAM) TOPICAL DAILY
Qty: 30 TAB | Refills: 1 | Status: SHIPPED | OUTPATIENT
Start: 2021-03-18 | End: 2021-06-17 | Stop reason: SDUPTHER

## 2021-03-18 NOTE — TELEPHONE ENCOUNTER
----- Message from Sissy De La Cruz NP sent at 3/18/2021  8:35 AM EDT -----  Magnesium 1.9 start mag oxide 400mg daily, rest of labs stable. I called patient, spoke with mother, as patient currently does not have a cell phone. Reviewed instructions for medications and gave her number for coordination of care to schedule MRI as soon as possible. She will give information to patient.

## 2021-03-24 ENCOUNTER — TELEPHONE (OUTPATIENT)
Dept: SLEEP MEDICINE | Age: 41
End: 2021-03-24

## 2021-03-24 NOTE — TELEPHONE ENCOUNTER
Phoned the patient to schedule sleep consultation visit per Barbie Means NP for SAAVEDRA. Unable to LVM.

## 2021-03-25 ENCOUNTER — TELEPHONE (OUTPATIENT)
Dept: CARDIOLOGY CLINIC | Age: 41
End: 2021-03-25

## 2021-03-25 NOTE — TELEPHONE ENCOUNTER
Sleep Medicine referral faxed to [address] [place] [address] ([phone]). This will be reviewed and they will call patient to schedule his appointment.

## 2021-05-11 DIAGNOSIS — I50.9 CONGESTIVE HEART FAILURE, UNSPECIFIED HF CHRONICITY, UNSPECIFIED HEART FAILURE TYPE (HCC): ICD-10-CM

## 2021-05-11 DIAGNOSIS — R06.02 SHORTNESS OF BREATH: ICD-10-CM

## 2021-05-11 RX ORDER — CARVEDILOL 3.12 MG/1
TABLET ORAL
Qty: 60 TAB | Refills: 0 | Status: SHIPPED | OUTPATIENT
Start: 2021-05-11 | End: 2021-06-17 | Stop reason: SDUPTHER

## 2021-05-11 RX ORDER — FUROSEMIDE 40 MG/1
TABLET ORAL
Qty: 90 TAB | Refills: 0 | Status: SHIPPED | OUTPATIENT
Start: 2021-05-11 | End: 2021-06-17 | Stop reason: SDUPTHER

## 2021-06-17 ENCOUNTER — OFFICE VISIT (OUTPATIENT)
Dept: SLEEP MEDICINE | Age: 41
End: 2021-06-17
Payer: MEDICAID

## 2021-06-17 ENCOUNTER — TELEPHONE (OUTPATIENT)
Dept: SLEEP MEDICINE | Age: 41
End: 2021-06-17

## 2021-06-17 ENCOUNTER — OFFICE VISIT (OUTPATIENT)
Dept: CARDIOLOGY CLINIC | Age: 41
End: 2021-06-17

## 2021-06-17 VITALS
BODY MASS INDEX: 25.46 KG/M2 | OXYGEN SATURATION: 100 % | SYSTOLIC BLOOD PRESSURE: 158 MMHG | TEMPERATURE: 98.3 F | HEART RATE: 73 BPM | WEIGHT: 171.9 LBS | HEIGHT: 69 IN | DIASTOLIC BLOOD PRESSURE: 107 MMHG

## 2021-06-17 VITALS
WEIGHT: 171.4 LBS | HEART RATE: 81 BPM | DIASTOLIC BLOOD PRESSURE: 98 MMHG | RESPIRATION RATE: 12 BRPM | HEIGHT: 69 IN | SYSTOLIC BLOOD PRESSURE: 138 MMHG | BODY MASS INDEX: 25.39 KG/M2 | OXYGEN SATURATION: 98 % | TEMPERATURE: 98.6 F

## 2021-06-17 DIAGNOSIS — I50.9 STAGE C CHRONIC COMBINED CONGESTIVE HEART FAILURE (HCC): ICD-10-CM

## 2021-06-17 DIAGNOSIS — G47.33 OSA (OBSTRUCTIVE SLEEP APNEA): Primary | ICD-10-CM

## 2021-06-17 DIAGNOSIS — I50.9 CONGESTIVE HEART FAILURE, UNSPECIFIED HF CHRONICITY, UNSPECIFIED HEART FAILURE TYPE (HCC): Primary | ICD-10-CM

## 2021-06-17 DIAGNOSIS — I10 ESSENTIAL HYPERTENSION: ICD-10-CM

## 2021-06-17 DIAGNOSIS — I50.20 NYHA CLASS 3 HEART FAILURE WITH REDUCED EJECTION FRACTION (HCC): ICD-10-CM

## 2021-06-17 DIAGNOSIS — M10.9 GOUT, UNSPECIFIED CAUSE, UNSPECIFIED CHRONICITY, UNSPECIFIED SITE: ICD-10-CM

## 2021-06-17 DIAGNOSIS — R06.02 SHORTNESS OF BREATH: ICD-10-CM

## 2021-06-17 DIAGNOSIS — E55.9 VITAMIN D DEFICIENCY: ICD-10-CM

## 2021-06-17 DIAGNOSIS — Z79.01 CHRONIC ANTICOAGULATION: ICD-10-CM

## 2021-06-17 PROCEDURE — 99214 OFFICE O/P EST MOD 30 MIN: CPT | Performed by: NURSE PRACTITIONER

## 2021-06-17 PROCEDURE — 99000 SPECIMEN HANDLING OFFICE-LAB: CPT | Performed by: NURSE PRACTITIONER

## 2021-06-17 PROCEDURE — 99204 OFFICE O/P NEW MOD 45 MIN: CPT | Performed by: INTERNAL MEDICINE

## 2021-06-17 RX ORDER — LANOLIN ALCOHOL/MO/W.PET/CERES
400 CREAM (GRAM) TOPICAL DAILY
Qty: 30 TABLET | Refills: 1 | Status: SHIPPED | OUTPATIENT
Start: 2021-06-17 | End: 2021-11-16

## 2021-06-17 RX ORDER — FUROSEMIDE 40 MG/1
40 TABLET ORAL DAILY
Qty: 30 TABLET | Refills: 2 | Status: SHIPPED | OUTPATIENT
Start: 2021-06-17 | End: 2021-06-18

## 2021-06-17 RX ORDER — POTASSIUM CHLORIDE 20 MEQ/1
20 TABLET, EXTENDED RELEASE ORAL DAILY
Qty: 30 TABLET | Refills: 2 | Status: SHIPPED | OUTPATIENT
Start: 2021-06-17 | End: 2021-07-08

## 2021-06-17 RX ORDER — CARVEDILOL 3.12 MG/1
3.12 TABLET ORAL 2 TIMES DAILY
Qty: 60 TABLET | Refills: 2 | Status: SHIPPED | OUTPATIENT
Start: 2021-06-17 | End: 2021-11-16

## 2021-06-17 RX ORDER — SACUBITRIL AND VALSARTAN 24; 26 MG/1; MG/1
0.5 TABLET, FILM COATED ORAL 2 TIMES DAILY
Qty: 30 TABLET | Refills: 2 | Status: SHIPPED | OUTPATIENT
Start: 2021-06-17 | End: 2021-11-16

## 2021-06-17 RX ORDER — SPIRONOLACTONE 25 MG/1
TABLET ORAL
Qty: 30 TABLET | Refills: 2 | Status: SHIPPED | OUTPATIENT
Start: 2021-06-17 | End: 2021-11-29

## 2021-06-17 NOTE — PROGRESS NOTES
[address] [facility], [address] [facility] Note    Patient name: Delmi Collins  Patient : 1980  Patient MRN: [id number]  Date of service: 21    Primary care physician: None  Primary general cardiologist:  Dr. Valarie Foster, NP  Primary [facility] cardiologist: Ruddy Pierson MD    CHIEF COMPLAINT:  Chief Complaint   Patient presents with    Follow-up       PLAN:  · Patient with severe cardiomyopathy and advanced heart failure symptoms, unable to tolerate GDMT due to hypotension,   · If no recovery of LVEF we will initiate workup for heart transplant/LVAD - closest transplant site would be [facility] and we will facilitate referral if needed  · Importance of abstinence from substance use was previously discussed with patient and mother; Helder Champagne will need 6 months period substance free to be considered for cardiac replacement therapy and formal enrollment in counseling and treatment program if addiction disorder was identified.      · Will check  cMRI based on these results will schedule RHC for further workup of his cardiomyopathy   · Based on cMRI will determine if he can stop/continue anticoagulation, will require ICD placement     Continue current medical therapy for heart failure  Continue current dose of coreg to 3.125mg twice daily due to hypotension/aTTR  Continue current dose of entresto one/half tablet of 24/26mg twice daily; no uptitration due to aTTR  Continue current dose of spironolactone 25mg daily  Continue lasix 40mg po daily  Continue allopurinol 100 mg PO daily, uric acid level 7.6  Chronic anticoagulation with coumadin, followed by primary cardiologist- has not check INR lately  Continue ergocalciferol, Vitamin D level 15- recheck level today   Does not take ASA or statin  Working with Dr. Bola Perez to schedule sleep study, had initial visit on   Invitae positive for aTTR; previously d/w patient and Mother; information for genetic counseling provided  PYP negative for cardiac amyloid  [place]ox[place]ackie ab and Parvo positive, check Parvo PCR   Labs today   Needs to reschedule CMRI- number given   Reinforced low salt diet - handout previously provided  Reinforced fluid restriction to 6 x 8oz glasses per day  Counseled on importance of THC wean; concern re: addiction disorder and provided with counseling options- no UDS today, patient acknowledges current use   Discussed importance of daily weights  Document in diary BP/HR and daily weights  Follow-up with primary cardiologist  Recommend flu, PNA - to be done at [facility], COVID vaccinations  Check INR today   Follow up in 6-8 weeks     IMPRESSION:  Shortness of breath  Chronic systolic heart failure   Stage C, NYHA class IIIA symptoms   Patient diagnosed with heart failure 3 years ago (per patient report, no records)   Reportedly non-ischemic cardiomyopathy, LVEF 10%   H/o chronic troponin elevation   No record of LHC   LV thrombus 2.7 x 3cm (by echo 9/2020)  · H/o renal infarcts  · H/o splenic infarcts  · Chronic anticoagulation with coumadin  At risk for sudden cardiac death  · Lifevest  Cardiac risk factors   HTN   HL   Former tobacco use, 1.25 pack years, quit 2015   Alcohol use, 5-6 drinks >4 times weekly, quit 9/2020   Marijuana, daily  CKD, stage 3 (baseline Cr 2.3)  Bronchitis  Anxiety    CARDIAC IMAGING:  TTE (2/17/21) LVEF 21%, no mention of LV thrombus  Echo (9/16/20) LVEF 10%, LVEDD 5.4cm, LV thrombus 2.7x 3cm, reduced RV function, mild PAH  EKG (9/19/20) sinus tachycardia 102bpm, QRS 95ms  LHC no records  CMRI- pending     HEMODYNAMICS:  RHC not done  CPEST not done    6 Min Walk Report 3/17/2021 2/17/2021   (PRE) HR 88 70   (PRE) O2 Sat 98 98   (POST) HR 94 108   (POST) O2 Sat 85 96   Distance in Meters 362.1 369.75       FAMILY HISTORY:  Mother - hypertension  Father - stroke  Sister - no known problems  Maternal grandfather had heart failure requirin heart transplant  All of maternal grandfather's brothers and sisters with heart failure  Maternal cousin with heart failure  Maternal cousin with obesity     SOCIAL HISTORY:  Not working since 2020; applied for disability  Lives in [place],  [address] [facility] this is [place]5 hours from [place] and 2 hours from [facility], 2 biological children (one years old and 8years old, both healthy)  Former tobacco use, 1.25 pack years, quit   Alcohol use, 5-6 drinks >4 times weekly, quit 2020  Marijuana, daily  Lives alone  Social History     Socioeconomic History    Marital status: SINGLE     Spouse name: Not on file    Number of children: Not on file    Years of education: Not on file    Highest education level: Not on file   Occupational History    Not on file   Tobacco Use    Smoking status: Former Smoker     Types: Cigarettes     Quit date: 2012     Years since quittin.4    Smokeless tobacco: Never Used   Substance and Sexual Activity    Alcohol use: Yes    Drug use: Never    Sexual activity: Not on file   Other Topics Concern     Service Not Asked    Blood Transfusions Not Asked    Caffeine Concern Not Asked    Occupational Exposure Not Asked    Hobby Hazards Not Asked    Sleep Concern Not Asked    Stress Concern Not Asked    Weight Concern Not Asked    Special Diet Not Asked    Back Care Not Asked    Exercise Not Asked    Bike Helmet Not Asked    [address] Not Asked    Self-Exams Not Asked   Social History Narrative    Not on file     Social Determinants of Health     Financial Resource Strain:     Difficulty of Paying Living Expenses:    Food Insecurity:     Worried About Running Out of Food in the Last Year:     [address] in the Last Year:    Transportation Needs:     Lack of Transportation (Medical):      Lack of Transportation (Non-Medical):    Physical Activity:     Days of Exercise per Week:     Minutes of Exercise per Session:    Stress:     Feeling of Stress :    Social Connections:     Frequency of Communication with Friends and Family:     Frequency of Social Gatherings with Friends and Family:     Attends Buddhist Services:     Active Member of Clubs or Organizations:     Attends Club or Organization Meetings:     Marital Status:    Intimate Partner Violence:     Fear of Current or Ex-Partner:     Emotionally Abused:     Physically Abused:     Sexually Abused:          HISTORY OF PRESENT ILLNESS:  I had the pleasure of seeing Delmi Collins in [address] [facility] at [address] [facility] in [address] [facility]. Briefly, Delmi Collins is a 42 y.o. male with h/o tobacco (quite 2015), alcohol (quit 9/2020) and THC use (daily) and strong family history of several members with early cardiomyopathy and cardiac deaths. Patient presents with chronic systolic heart failure, stage C, NYHA class IIIA symptoms, states was diagnosed with heart failure 3 years ago (per patient report, no records). He was off medications for several months. Patient was then admitted with acute heart failure, reportedly non-ischemic cardiomyopathy, LVEF 10% (no record of [facility]), LV thrombus 2.7 x 3cm (by echo 9/2020) c/b splenic and renal infarcts started on chronic anticoagulation with coumadin. Discharged home with lifevest and referred to [facility] today for further evaluation and treatment of severe cardiomyopathy. Patient lives 2.5 hours from [address] [place] [address], he lives closer to [place] but for the next couple of months would like to follow with us. If needs heart transplant [facility] would be the closer site. INTERVAL HISTORY:  Today, Mr. Dennis Westfall presents for follow up visit. Overall he states he feels at his baseline, he walks daily to his grandmother's house which is less than a mile away and only gets tired on a steep incline. He did not take his medications this morning due to being in a rush to make it to 2 MD appointments.  He denies dizziness, SOB at rest, changes in appetite, chest pain or palpitations. He continues to use marijuana despite numerous conversations about abstinence. He is working with sleep medicine to schedule a sleep study and he has to rescheduled his CMRI due to transportation. He is not wearing a lifevest despite conversations about SCD. He states his financial support remains a large barrier to his medical care but is now currently receiving disability. REVIEW OF SYSTEMS:  General: Denies fever, night sweats. Ear, nose and throat: Denies difficulty hearing, sinus problems, runny nose, post-nasal drip, ringing in ears, mouth sores, loose teeth, ear pain, nosebleeds, sore throate, facial pain or numbess  Cardiovascular: see above in the interval history  Respiratory: Denies cough, wheezing, sputum production, hemoptysis. Gastrointestinal: Denies heartburn, constipation, intolerance to certain foods, diarrhea, abdominal pain, nausea, vomiting, difficulty swallowing, blood in stool  Kidney and bladder: Denies painful urination, frequent urination, urgency, prostate problems and impotence  Musculoskeletal: Pain along left side/lower ribcage, denies muscle weakness  Skin and hair: Denies change in existing skin lesions, hair loss or increase, breast changes    PHYSICAL EXAM:  Visit Vitals  BP (!) 138/98 (BP 1 Location: Left arm, BP Patient Position: Sitting, BP Cuff Size: Adult) Comment: patient has not taken his AM medications today   Pulse 81   Temp 98.6 °F (37 °C) (Oral)   Resp 12   Ht 5' 9\" (1.753 m)   Wt 171 lb 6.4 oz (77.7 kg)   SpO2 98%   BMI 25.31 kg/m²     General: Patient is well developed, well-nourished, looks uncomfortable, holding left side  HEENT: Normocephalic and atraumatic. No scleral icterus. Pupils are equal, round and reactive to light and accomodation. No conjunctival injection. Oropharynx is clear. Neck: Supple. No evidence of thyroid enlargements or lymphadenopathy.   JVD: Cannot be appreciated   Lungs: lungs clear throughout,  No wheezes or rhonchi, or rales  Heart: Regular rate and rhythm with normal S1 and S2. No murmurs, gallops or rubs. Abdomen: Soft, no mass or tenderness. No organomegaly or hernia. Bowel sounds present. Genitourinary and rectal: deferred  Extremities: No cyanosis, clubbing, or edema. Neurologic: No focal sensory or motor deficits are noted. Grossly intact. Psychiatric: Awake, alert and oriented x 3. Appropriate mood and affect. Skin: Warm, dry and well perfused. No lesions, nodules or rashes are noted. Current Outpatient Medications on File Prior to Visit   Medication Sig Dispense Refill    furosemide (LASIX) 40 mg tablet TAKE 1 TABLET BY MOUTH TWICE A DAY,MAY TAKE AN ADDITIONAL TAB DAILY AS NEEDED AS DIRECTED BY [facility] (Patient taking differently: Take 40 mg by mouth daily.) 90 Tab 0    carvediloL (COREG) 3.125 mg tablet TAKE 1 TABLET BY MOUTH TWICE A DAY WITH MEALS 60 Tab 0    magnesium oxide (MAG-OX) 400 mg tablet Take 1 Tab by mouth daily. 30 Tab 1    pantoprazole (PROTONIX) 20 mg tablet Take 2 Tabs by mouth daily. 60 Tab 2    potassium chloride (K-DUR, KLOR-CON) 20 mEq tablet Take 1 Tab by mouth daily. May take an additional 20 mEq daily PRN as directed by Edilberto Jaime 1721. 60 Tab 2    allopurinoL (ZYLOPRIM) 100 mg tablet Take 1 Tab by mouth daily. 30 Tab 5    sacubitriL-valsartan (Entresto) 24-26 mg tablet Take 0.5 Tabs by mouth two (2) times a day. Take 1/2 tab two times a day. 60 Tab 2    [DISCONTINUED] ergocalciferol (ERGOCALCIFEROL) 1,250 mcg (50,000 unit) capsule Take 1 Cap by mouth every seven (7) days. 12 Cap 0    spironolactone (ALDACTONE) 25 mg tablet Take 1/2 tab daily 30 Tab 2    warfarin (COUMADIN) 7.5 mg tablet Take 7.5 mg by mouth daily. No current facility-administered medications on file prior to visit. Thank you for your referral and allowing me to participate in this patient's care.     Adal Blanc, NP  [address] [place] [address], [address]  Phone: [phone]    Total visit time: 30 minutes (> 50% spent face-to-face counseling)

## 2021-06-17 NOTE — PATIENT INSTRUCTIONS
Medication changes: No changes Please take this to your pharmacy to notify them of the change in medications. Testing Ordered: 
 
Labs done in clinic today- you will be notified of any abnormal results that require a change in medication regimen CALL [phone] to schedule your cardiac MRI Other Recommendations: Follow up with sleep medicine Please follow up with primary care provider about flu, pneumonia, and covid vaccines Call Dr. America Steward 3 [phone] to follow up with your Coumadin dosing Ensure your drinking an adequate amount of water with a goal of 6-8 eight ounce glasses (1.5-2 liters) of fluid daily. Your urine should be clear and light yellow straw colored. If your blood pressure begins to consistently run below 90/60 and/or you begin to experience dizziness or lightheadedness, please contact the [facility] at [phone]. Follow up 6 to 8 weeks with [facility] Please monitor your weights daily upon waking and after using the bathroom. Keep a written records of your weights and bring to your next appointment. If you have a weight gain of 3 or more pounds overnight OR 5 or more pounds in one week please contact our office. Thank you for allowing us the privilege of being a part of your healthcare team! Please do not hesitate to contact our office at [phone] with any questions or concerns. Virtual Heart Failure Support Group [facility] invites you to learn more about heart failure and to share your questions, ideas, and experiences with others. Each month, the Heart Failure Support Group features a new educational topic and a guest speaker, followed by an interactive discussion. Our Heart Failure Nurse Navigator will moderate each session. You will be able to participate by phone, tablet or computer through [employer].  This support group takes place on the 3rd Thursday of each month from 6:00-7:30PM. All individuals living with heart failure and their caregivers are welcome to join. If you are interested in participating, please contact us at [email or web address] and you will be sent the link to join the [employer].

## 2021-06-17 NOTE — PATIENT INSTRUCTIONS
[address] [place]. [address], [address] Tel.  [phone] Fax. 100 [facility] [address] [place], [address] [facility] Tel.  [phone] Fax. [phone] [address]  Tel.  [phone] Fax. [phone] Sleep Apnea: After Your Visit Your Care Instructions Sleep apnea occurs when you frequently stop breathing for 10 seconds or longer during sleep. It can be mild to severe, based on the number of times per hour that you stop breathing or have slowed breathing. Blocked or narrowed airways in your nose, mouth, or throat can cause sleep apnea. Your airway can become blocked when your throat muscles and tongue relax during sleep. Sleep apnea is common, occurring in 1 out of 20 individuals. Individuals having any of the following characteristics should be evaluated and treated right away due to high risk and detrimental consequences from untreated sleep apnea: 
1. Obesity 2. Congestive Heart failure 3. Atrial Fibrillation 4. Uncontrolled Hypertension 5. Type II Diabetes 6. Night-time Arrhythmias 7. Stroke 8. Pulmonary Hypertension 9. High-risk Driving Populations (pilots, truck drivers, etc.) 10. Patients Considering Weight-loss Surgery How do you know you have sleep apnea? You probably have sleep apnea if you answer 'yes' to 3 or more of the following questions: S - Have you been told that you Snore? T - Are you often Tired during the day? O - Has anyone Observed you stop breathing while sleeping? P- Do you have (or are being treated for) high blood Pressure? B - Are you obese (Body Mass Index > 35)? A - Is your Age 48years old or older? N - Is your Neck size greater than 16 inches? G - Are you male Gender? A sleep physician can prescribe a breathing device that prevents tissues in the throat from blocking your airway.  Or your doctor may recommend using a dental device (oral breathing device) to help keep your airway open. In some cases, surgery may be needed to remove enlarged tissues in the throat. Follow-up care is a key part of your treatment and safety. Be sure to make and go to all appointments, and call your doctor if you are having problems. It's also a good idea to know your test results and keep a list of the medicines you take. How can you care for yourself at home? · Lose weight, if needed. It may reduce the number of times you stop breathing or have slowed breathing. · Go to bed at the same time every night. · Sleep on your side. It may stop mild apnea. If you tend to roll onto your back, sew a pocket in the back of your pajama top. Put a tennis ball into the pocket, and stitch the pocket shut. This will help keep you from sleeping on your back. · Avoid alcohol and medicines such as sleeping pills and sedatives before bed. · Do not smoke. Smoking can make sleep apnea worse. If you need help quitting, talk to your doctor about stop-smoking programs and medicines. These can increase your chances of quitting for good. · Prop up the head of your bed 4 to 6 inches by putting bricks under the legs of the bed. · Treat breathing problems, such as a stuffy nose, caused by a cold or allergies. · Use a continuous positive airway pressure (CPAP) breathing machine if lifestyle changes do not help your apnea and your doctor recommends it. The machine keeps your airway from closing when you sleep. · If CPAP does not help you, ask your doctor whether you should try other breathing machines. A bilevel positive airway pressure machine has two types of air pressureâone for breathing in and one for breathing out. Another device raises or lowers air pressure as needed while you breathe. · If your nose feels dry or bleeds when using one of these machines, talk with your doctor about increasing moisture in the air. A humidifier may help.  
· If your nose is runny or stuffy from using a breathing machine, talk with your doctor about using decongestants or a corticosteroid nasal spray. When should you call for help? Watch closely for changes in your health, and be sure to contact your doctor if: 
· You still have sleep apnea even though you have made lifestyle changes. · You are thinking of trying a device such as CPAP. · You are having problems using a CPAP or similar machine. Where can you learn more? Go to Scandlines. Enter O513 in the search box to learn more about \"Sleep Apnea: After Your Visit. \"  
© 8842-9532 Healthwise, Incorporated. Care instructions adapted under license by [facility] (which disclaims liability or warranty for this information). This care instruction is for use with your licensed healthcare professional. If you have questions about a medical condition or this instruction, always ask your healthcare professional. Josephine Ruffin any warranty or liability for your use of this information. PROPER SLEEP HYGIENE What to avoid · Do not have drinks with caffeine, such as coffee or black tea, for 8 hours before bed. · Do not smoke or use other types of tobacco near bedtime. Nicotine is a stimulant and can keep you awake. · Avoid drinking alcohol late in the evening, because it can cause you to wake in the middle of the night. · Do not eat a big meal close to bedtime. If you are hungry, eat a light snack. · Do not drink a lot of water close to bedtime, because the need to urinate may wake you up during the night. · Do not read or watch TV in bed. Use the bed only for sleeping and sexual activity. What to try · Go to bed at the same time every night, and wake up at the same time every morning. Do not take naps during the day. · Keep your bedroom quiet, dark, and cool. · Get regular exercise, but not within 3 to 4 hours of your bedtime. Angela Arteaga · Sleep on a comfortable pillow and mattress.  
· If watching the clock makes you anxious, turn it facing away from you so you cannot see the time. · If you worry when you lie down, start a worry book. Well before bedtime, write down your worries, and then set the book and your concerns aside. · Try meditation or other relaxation techniques before you go to bed. · If you cannot fall asleep, get up and go to another room until you feel sleepy. Do something relaxing. Repeat your bedtime routine before you go to bed again. · Make your house quiet and calm about an hour before bedtime. Turn down the lights, turn off the TV, log off the computer, and turn down the volume on music. This can help you relax after a busy day. Drowsy Driving The [place] [address] cites drowsiness as a causing factor in more than 676,805 police reported crashes annually, resulting in 76,000 injuries and 1,500 deaths. Other surveys suggest 55% of people polled have driven while drowsy in the past year, 23% had fallen asleep but not crashed, 3% crashed, and 2% had and accident due to drowsy driving. Who is at risk? Young Drivers: One study of drowsy driving accidents states that 55% of the drivers were under 25 years. Of those, 75% were male. Shift Workers and Travelers: People who work overnight or travel across time zones frequently are at higher risk of experiencing Circadian Rhythm Disorders. They are trying to work and function when their body is programed to sleep. Sleep Deprived: Lack of sleep has a serious impact on your ability to pay attention or focus on a task. Consistently getting less than the average of 8 hours your body needs creates partial or cumulative sleep deprivation. Untreated Sleep Disorders: Sleep Apnea, Narcolepsy, R.L.S., and other sleep disorders (untreated) prevent a person from getting enough restful sleep. This leads to excessive daytime sleepiness and increases the risk for drowsy driving accidents by up to 7 times.  
Medications / Alcohol: Even over the counter medications can cause drowsiness. Medications that impair a drivers attention should have a warning label. Alcohol naturally makes you sleepy and on its own can cause accidents. Combined with excessive drowsiness its effects are amplified. Signs of Drowsy Driving: * You don't remember driving the last few miles * You may drift out of your rock * You are unable to focus and your thoughts wander * You may yawn more often than normal 
 * You have difficulty keeping your eyes open / nodding off * Missing traffic signs, speeding, or tailgating Prevention-  
Good sleep hygiene, lifestyle and behavioral choices have the most impact on drowsy driving. There is no substitute for sleep and the average person requires 8 hours nightly. If you find yourself driving drowsy, stop and sleep. Consider the sleep hygiene tips provided during your visit as well. Medication Refill Policy: Refills for all medications require 1 week advance notice. Please have your pharmacy fax a refill request. We are unable to fax, or call in \"controled substance\" medications and you will need to pick these prescriptions up from our office. Seaters Activation Thank you for requesting access to Seaters. Please follow the instructions below to securely access and download your online medical record. Seaters allows you to send messages to your doctor, view your test results, renew your prescriptions, schedule appointments, and more. How Do I Sign Up? 1. In your internet browser, go to [email or web address]. 2. Click on the First Time User? Click Here link in the Sign In box. You will see the New Member Sign Up page. 3. Enter your Seaters Access Code exactly as it appears below. You will not need to use this code after youve completed the sign-up process. If you do not sign up before the expiration date, you must request a new code. Seaters Access Code: [email or web address] Expires: 6/28/2021  2:19 PM (This is the date your eSeekers access code will ) 4. Enter the last four digits of your Social Security Number (xxxx) and Date of Birth (mm/dd/yyyy) as indicated and click Submit. You will be taken to the next sign-up page. 5. Create a Silent Edget ID. This will be your eSeekers login ID and cannot be changed, so think of one that is secure and easy to remember. 6. Create a eSeekers password. You can change your password at any time. 7. Enter your Password Reset Question and Answer. This can be used at a later time if you forget your password. 8. Enter your e-mail address. You will receive e-mail notification when new information is available in [address] 9. Click Sign Up. You can now view and download portions of your medical record. 10. Click the Download Summary menu link to download a portable copy of your medical information. Additional Information If you have questions, please call [phone]. Remember, eSeekers is NOT to be used for urgent needs. For medical emergencies, dial 911.

## 2021-06-17 NOTE — PROGRESS NOTES
[address] [facility] [place]. [place], [address]  Tel.  [phone]  Fax. 100 [facility] [address]  [place], [address] [facility]  Tel.  [phone]  Fax. [phone] [address] [facility] [address]  Tel.  [phone]  Fax. [phone]       Jesus Schwartz is a 42y.o. year old male referred by Dr. Sivan No for evaluation of a sleep disorder. ASSESSMENT/PLAN:      ICD-10-CM ICD-9-CM    1. DEWEY (obstructive sleep apnea)  G47.33 327.23 POLYSOMNOGRAPHY 1 NIGHT   2. NYHA class 3 heart failure with reduced ejection fraction (HCC)  I50.20 428.9    3. Essential hypertension  I10 401.9    4. BMI 26.0-26.9,adult  Z68.26 V85.22        Patient has a history and examination consistent with the diagnosis of sleep apnea. Follow-up and Dispositions    · Return for telephone follow-up after testing is completed. * The patient currently has a Low Risk for having sleep apnea. STOP-BANG score 3.    * Sleep testing was ordered for initial evaluation. Orders Placed This Encounter    POLYSOMNOGRAPHY 1 NIGHT     Standing Status:   Future     Standing Expiration Date:   9/17/2021     Order Specific Question:   Reason for Exam     Answer:   DEWEY       * He was provided information on sleep apnea including corresponding risk factors and the importance of proper treatment. * Treatment options were reviewed in detail. he would like to proceed with PAP therapy. Patient will be seen in follow-up in 6-8 weeks after PAP setup to gauge treatment response and adherence to therapy. * The patient was counseled regarding proper sleep hygiene, with emphasis on ensuring sufficient total sleep time; safe driving and the benefits of exercise and weight loss. * All of his questions were addressed.     2. Recommended a dedicated weight loss program through appropriate diet and exercise regimen as significant weight reduction has been shown to reduce severity of obstructive sleep apnea. SUBJECTIVE/OBJECTIVE:    Shayy Rivera is an 42 y.o. male referred for evaluation for a sleep disorder. He complains of snoring associated with awakening in the middle of the night because of urination. Symptoms began several years ago, unchanged since that time. He usually can fall asleep in 10 minutes. Family or house members note snoring. He denies of symptoms indicative of cataplexy, sleep paralysis or sleep related hallucinations. He denies of a history of unusual movements occurring during sleep. Shayy Patient does not wake up frequently at night. He is not bothered by waking up too early and left unable to get back to sleep. He actually sleeps about 12 hours at night and wakes up about 3 times during the night. He does not work shifts:  .   Serafin Gilman indicates he does not get too little sleep at night. His bedtime is  . He awakens at  . He does not take naps. He takes   naps a week lasting  . He has the following observed behaviors:  ;  .  Other remarks: The patient has not undergone diagnostic testing for the current problems. Review of Systems:  Constitutional:  No significant weight loss or weight gain  Eyes:  No blurred vision  CVS:  No significant chest pain  Pulm:  No significant shortness of breath  GI:  No significant nausea or vomiting  :  No significant nocturia  Musculoskeletal:  No significant joint pain at night  Skin:  No significant rashes  Neuro:  No significant dizziness   Psych:  No active mood issues    Sleep Review of Systems: notable for Negative difficulty falling asleep; Positive awakenings at night; Negative perceived regular dreaming; Negative nightmares; Negative  early morning headaches; Negative  memory problems; Negative  concentration issues; Negative caffeine;  Negative alcohol;   Negative history of any automobile or occupational accidents due to daytime drowsiness.       [place] Sleepiness Score: 0   and Modified F.O.S.Q. Score Total / 2: 20    Visit Vitals  BP (!) 158/107 (BP 1 Location: Right arm, BP Patient Position: Sitting, BP Cuff Size: Adult)   Pulse 73   Temp 98.3 °F (36.8 °C)   Ht 5' 9\" (1.753 m)   Wt 171 lb 14.4 oz (78 kg)   SpO2 100%   BMI 25.39 kg/m²           General:   Alert, oriented, not in acute distress   Eyes:  Anicteric Sclerae; intact EOM's   Nose:  No obvious nasal septum deviation    Oropharynx:   Mallampati score 4, thick tongue base, uvula not seen due to low-lying soft palate, narrow tonsilo-pharyngeal pilars, tongue scalloped   Neck:   midline trachea,  no JVD   Chest/Lungs:  symmetrical lung expansion ,clear lung fields on auscultation    CVS:  Normal rate, regular rhythm    Extremities:  No obvious rashes, mild edema    Neuro:  No focal deficits; No obvious tremor    Psych:  Normal eye contact; normal  affect, normal countenance      Patient's phone number [phone] (home)  was reviewed and confirmed for accuracy. He gives permission for messages regarding results and appointments to be left at that number. Sara Garnett MD, FAASM  Diplomate American Board of Sleep Medicine  Diplomate in Sleep Medicine - ABP    Electronically signed.  06/17/21

## 2021-06-18 ENCOUNTER — TELEPHONE (OUTPATIENT)
Dept: CARDIOLOGY CLINIC | Age: 41
End: 2021-06-18

## 2021-06-18 DIAGNOSIS — I50.9 CONGESTIVE HEART FAILURE, UNSPECIFIED HF CHRONICITY, UNSPECIFIED HEART FAILURE TYPE (HCC): ICD-10-CM

## 2021-06-18 DIAGNOSIS — R06.02 SHORTNESS OF BREATH: ICD-10-CM

## 2021-06-18 LAB
25(OH)D3 SERPL-MCNC: 57.3 NG/ML (ref 30–100)
ALBUMIN SERPL-MCNC: 4.8 G/DL (ref 3.5–5)
ALBUMIN/GLOB SERPL: 1.4 {RATIO} (ref 1.1–2.2)
ALP SERPL-CCNC: 187 U/L (ref 45–117)
ALT SERPL-CCNC: 214 U/L (ref 12–78)
ANION GAP SERPL CALC-SCNC: 10 MMOL/L (ref 5–15)
AST SERPL-CCNC: 212 U/L (ref 15–37)
BILIRUB SERPL-MCNC: 1 MG/DL (ref 0.2–1)
BNP SERPL-MCNC: 1937 PG/ML
BUN SERPL-MCNC: 9 MG/DL (ref 6–20)
BUN/CREAT SERPL: 8 (ref 12–20)
CALCIUM SERPL-MCNC: 10 MG/DL (ref 8.5–10.1)
CHLORIDE SERPL-SCNC: 102 MMOL/L (ref 97–108)
CO2 SERPL-SCNC: 24 MMOL/L (ref 21–32)
CREAT SERPL-MCNC: 1.18 MG/DL (ref 0.7–1.3)
GLOBULIN SER CALC-MCNC: 3.5 G/DL (ref 2–4)
GLUCOSE SERPL-MCNC: 75 MG/DL (ref 65–100)
INR PPP: 0.9 (ref 0.9–1.1)
MAGNESIUM SERPL-MCNC: 1.8 MG/DL (ref 1.6–2.4)
POTASSIUM SERPL-SCNC: 4.4 MMOL/L (ref 3.5–5.1)
PROT SERPL-MCNC: 8.3 G/DL (ref 6.4–8.2)
PROTHROMBIN TIME: 9.8 SEC (ref 9–11.1)
SODIUM SERPL-SCNC: 136 MMOL/L (ref 136–145)

## 2021-06-18 RX ORDER — FUROSEMIDE 40 MG/1
40 TABLET ORAL 2 TIMES DAILY
Qty: 60 TABLET | Refills: 2 | Status: SHIPPED | OUTPATIENT
Start: 2021-06-18 | End: 2021-07-08

## 2021-06-18 NOTE — TELEPHONE ENCOUNTER
----- Message from Meghan Mike NP sent at 6/18/2021  1:24 PM EDT -----  Please call Susan Lin to discuss their abnormal lab results. Increase lasix to 40mg BID and repeat labs next week     Left message for patient to return call with message as noted above. All orders placed. I spoke with patient, he states understanding of all instructions and has no further instructions.

## 2021-06-18 NOTE — PROGRESS NOTES
Please call Paige Hazel to discuss their abnormal lab results.  Increase lasix to 40mg BID and repeat labs next week

## 2021-06-25 ENCOUNTER — TELEPHONE (OUTPATIENT)
Dept: CARDIOLOGY CLINIC | Age: 41
End: 2021-06-25

## 2021-06-25 NOTE — TELEPHONE ENCOUNTER
Telephone Call RE:  Lab Reminder      Outcome:     [] Patient verbalizes understanding    [x] Unable to reach   [] Left message              []     Mail Box has not been set up as yet.     Need to know what lab patient will use for lab order on Monday      [facility]

## 2021-06-28 ENCOUNTER — TELEPHONE (OUTPATIENT)
Dept: CARDIOLOGY CLINIC | Age: 41
End: 2021-06-28

## 2021-06-28 NOTE — TELEPHONE ENCOUNTER
Telephone Call RE:  Lab Reminder      Outcome:     [x] Patient verbalizes understanding    [] Unable to reach   [] Left message              []     Patient will be going [facility] on the 7th and will have his labs drawn then.   Fax # [phone]      Sammy Smith

## 2021-07-08 ENCOUNTER — TELEPHONE (OUTPATIENT)
Dept: CARDIOLOGY CLINIC | Age: 41
End: 2021-07-08

## 2021-07-08 DIAGNOSIS — R06.02 SHORTNESS OF BREATH: ICD-10-CM

## 2021-07-08 DIAGNOSIS — I50.9 CONGESTIVE HEART FAILURE, UNSPECIFIED HF CHRONICITY, UNSPECIFIED HEART FAILURE TYPE (HCC): ICD-10-CM

## 2021-07-08 RX ORDER — FUROSEMIDE 40 MG/1
40 TABLET ORAL DAILY
Qty: 60 TABLET | Refills: 2
Start: 2021-07-08 | End: 2022-04-26 | Stop reason: SDUPTHER

## 2021-07-08 RX ORDER — POTASSIUM CHLORIDE 20 MEQ/1
10 TABLET, EXTENDED RELEASE ORAL DAILY
Qty: 30 TABLET | Refills: 2 | Status: SHIPPED | OUTPATIENT
Start: 2021-07-08 | End: 2022-10-19 | Stop reason: SDUPTHER

## 2021-07-08 NOTE — TELEPHONE ENCOUNTER
I called patient and reviewed labs, advised him per AKBAR Sims:  Labs are improved, he can go back to normal dose of lasix, please have him decrease his potassium to 1/2 tab daily. Labs in 1 month    He states understanding. Medication list updated, labs ordered.

## 2021-08-04 ENCOUNTER — TELEPHONE (OUTPATIENT)
Dept: CARDIOLOGY CLINIC | Age: 41
End: 2021-08-04

## 2021-08-04 NOTE — TELEPHONE ENCOUNTER
Telephone Call RE:  Lab Reminder      Outcome:     [x] Patient verbalizes understanding    [] Unable to reach   [] Left message              []   Patient states he cannot do blood work tomorrow, has to wait until next week. This will be forwarded to Bell Paz RN for review and instructions.     Bronson Llanos

## 2021-08-11 ENCOUNTER — TELEPHONE (OUTPATIENT)
Dept: CARDIOLOGY CLINIC | Age: 41
End: 2021-08-11

## 2021-08-11 NOTE — TELEPHONE ENCOUNTER
Telephone Call RE:  Lab Reminder      Outcome:     [] Patient verbalizes understanding    [x] Unable to reach   [] Left message              []   Voice mail is not set up.   Darcie Arguelles

## 2021-08-19 RX ORDER — PANTOPRAZOLE SODIUM 20 MG/1
TABLET, DELAYED RELEASE ORAL
Qty: 60 TABLET | Refills: 2 | Status: SHIPPED | OUTPATIENT
Start: 2021-08-19 | End: 2021-11-16

## 2021-09-07 ENCOUNTER — HOSPITAL ENCOUNTER (OUTPATIENT)
Dept: MRI IMAGING | Age: 41
Discharge: HOME | End: 2021-09-07
Attending: NURSE PRACTITIONER
Payer: MEDICAID

## 2021-09-07 DIAGNOSIS — R06.09 DOE (DYSPNEA ON EXERTION): ICD-10-CM

## 2021-09-07 DIAGNOSIS — I50.20 NYHA CLASS 3 HEART FAILURE WITH REDUCED EJECTION FRACTION (HCC): ICD-10-CM

## 2021-09-07 PROCEDURE — 74011250636 HC RX REV CODE- 250/636: Performed by: NURSE PRACTITIONER

## 2021-09-07 PROCEDURE — 75561 CARDIAC MRI FOR MORPH W/DYE: CPT | Performed by: INTERNAL MEDICINE

## 2021-09-07 PROCEDURE — A9576 INJ PROHANCE MULTIPACK: HCPCS | Performed by: NURSE PRACTITIONER

## 2021-09-07 PROCEDURE — 75561 CARDIAC MRI FOR MORPH W/DYE: CPT

## 2021-09-07 RX ADMIN — GADOTERIDOL 25 ML: 279.3 INJECTION, SOLUTION INTRAVENOUS at 13:06

## 2021-09-13 ENCOUNTER — TELEPHONE (OUTPATIENT)
Dept: CARDIOLOGY CLINIC | Age: 41
End: 2021-09-13

## 2021-09-13 NOTE — TELEPHONE ENCOUNTER
4237 - Tried calling pt, no answer, voicemail not set up, unable to leave message. Will try to call again later. 1420 - called pt using two pt identifiers. Asked pt if he has had his labs drawn from last [facility] visit. Pt states he has not and asked if we could do them in clinic at his appointment on 9/14/21. Pt was informed we could ask the NP during his appointment if we could draw them here tomorrow. Pt stated understanding and had no further questions or concerns.

## 2021-09-14 ENCOUNTER — TELEPHONE (OUTPATIENT)
Dept: CARDIOLOGY CLINIC | Age: 41
End: 2021-09-14

## 2021-09-14 ENCOUNTER — OFFICE VISIT (OUTPATIENT)
Dept: CARDIOLOGY CLINIC | Age: 41
End: 2021-09-14
Payer: MEDICAID

## 2021-09-14 VITALS
SYSTOLIC BLOOD PRESSURE: 104 MMHG | DIASTOLIC BLOOD PRESSURE: 78 MMHG | HEIGHT: 69 IN | BODY MASS INDEX: 25.98 KG/M2 | TEMPERATURE: 98.7 F | WEIGHT: 175.4 LBS | RESPIRATION RATE: 12 BRPM | OXYGEN SATURATION: 98 % | HEART RATE: 81 BPM

## 2021-09-14 DIAGNOSIS — Z23 ENCOUNTER FOR IMMUNIZATION: ICD-10-CM

## 2021-09-14 DIAGNOSIS — Z79.01 CHRONIC ANTICOAGULATION: ICD-10-CM

## 2021-09-14 DIAGNOSIS — R53.83 FATIGUE, UNSPECIFIED TYPE: ICD-10-CM

## 2021-09-14 DIAGNOSIS — R06.02 SHORTNESS OF BREATH: ICD-10-CM

## 2021-09-14 DIAGNOSIS — Z23 NEEDS FLU SHOT: ICD-10-CM

## 2021-09-14 DIAGNOSIS — I50.9 CONGESTIVE HEART FAILURE, UNSPECIFIED HF CHRONICITY, UNSPECIFIED HEART FAILURE TYPE (HCC): Primary | ICD-10-CM

## 2021-09-14 PROCEDURE — 90471 IMMUNIZATION ADMIN: CPT

## 2021-09-14 PROCEDURE — 99214 OFFICE O/P EST MOD 30 MIN: CPT | Performed by: NURSE PRACTITIONER

## 2021-09-14 PROCEDURE — 90686 IIV4 VACC NO PRSV 0.5 ML IM: CPT

## 2021-09-14 PROCEDURE — 94618 PULMONARY STRESS TESTING: CPT | Performed by: NURSE PRACTITIONER

## 2021-09-14 PROCEDURE — 90670 PCV13 VACCINE IM: CPT

## 2021-09-14 PROCEDURE — 90472 IMMUNIZATION ADMIN EACH ADD: CPT

## 2021-09-14 NOTE — PROGRESS NOTES
Coral Duggan is a 42 y.o. male who presents for routine immunizations. He denies any symptoms , reactions or allergies that would exclude them from being immunized today. Risks and adverse reactions were discussed and the VIS was given to them. All questions were addressed. He was observed for 15 min post injection. There were no reactions observed.     Ovi Robbins RN

## 2021-09-14 NOTE — PROGRESS NOTES
Umair Pfeiffer is a 42 y.o. male who presents for routine immunizations. He denies any symptoms , reactions or allergies that would exclude them from being immunized today. Risks and adverse reactions were discussed and the VIS was given to them. All questions were addressed. He was observed for 15 min post injection. There were no reactions observed.     Eveline Hawk RN

## 2021-09-14 NOTE — PATIENT INSTRUCTIONS
Medication changes:    none      Testing Ordered: We are drawing bloodwork on you today. We will call you when we get the results. We will send a request for an echocardiogram (ultrasound of the heart) to be done at your primary cardiologist's office at [facility]. This should be done in December. We will look into possibilities for sleep study to be done closer to home for you. Other Recommendations:      Ensure your drinking an adequate amount of water with a goal of 6-8 eight ounce glasses (1.5-2 liters) of fluid daily. Your urine should be clear and light yellow straw colored. If your blood pressure begins to consistently run below 90/60 and/or you begin to experience dizziness or lightheadedness, please contact the [address] at [phone]. Follow up in 3 months (virtual) with [facility]      Please monitor your weights daily upon waking and after using the bathroom. Keep a written records of your weights and bring to your next appointment. If you have a weight gain of 3 or more pounds overnight OR 5 or more pounds in one week please contact our office. Thank you for allowing us the privilege of being a part of your healthcare team! Please do not hesitate to contact our office at [phone] with any questions or concerns. Virtual Heart Failure [address] invites you to learn more about heart failure and to share your questions, ideas, and experiences with others. Each month, the Heart Failure Support Group features a new educational topic and a guest speaker, followed by an interactive discussion. Our Heart Failure Nurse Navigator will moderate each session. You will be able to participate by phone, tablet or computer through [employer].  This support group takes place on the 3rd Thursday of each month from 6:00-7:30PM. All individuals living with heart failure and their caregivers are welcome to join. If you are interested in participating, please contact us at [email or web address] and you will be sent the link to join the [employer]. Vaccine Information Statement    Pneumococcal Conjugate Vaccine (PCV13): What You Need to Know    Many Vaccine Information Statements are available in [place] and other languages. See www.immunize.org/vis  Hojas de información sobre vacunas están disponibles en español y en muchos otros idiomas. Visite www.immunize.org/vis    1. Why get vaccinated? Pneumococcal conjugate vaccine (PCV13) can prevent pneumococcal disease. Pneumococcal disease refers to any illness caused by pneumococcal bacteria. These bacteria can cause many types of illnesses, including pneumonia, which is an infection of the lungs. Pneumococcal bacteria are one of the most common causes of pneumonia. Besides pneumonia, pneumococcal bacteria can also cause:   Ear infections   Sinus infections   Meningitis (infection of the tissue covering the brain and spinal cord)   Bacteremia (bloodstream infection)    Anyone can get pneumococcal disease, but children under 3years of age, people with certain medical conditions, adults 72 years or older, and cigarette smokers are at the highest risk. Most pneumococcal infections are mild. However, some can result in long-term problems, such as brain damage or hearing loss. Meningitis, bacteremia, and pneumonia caused by pneumococcal disease can be fatal.     2. PCV13     PCV13 protects against 13 types of bacteria that cause pneumococcal disease. Infants and young children usually need 4 doses of pneumococcal conjugate vaccine, at 2, 4, 6, and 1515 months of age. In some cases, a child might need fewer than 4 doses to complete PCV13 vaccination. A dose of PCV13 is also recommended for anyone 2 years or older with certain medical conditions if they did not already receive PCV13.     This vaccine may be given to adults 65 years or older based on discussions between the patient and health care provider. 3. Talk with your health care provider    Tell your vaccine provider if the person getting the vaccine:   Has had an allergic reaction after a previous dose of PCV13, to an earlier pneumococcal conjugate vaccine known as PCV7, or to any vaccine containing diphtheria toxoid (for example, DTaP), or has any severe, life-threatening allergies. In some cases, your health care provider may decide to postpone PCV13 vaccination to a future visit. People with minor illnesses, such as a cold, may be vaccinated. People who are moderately or severely ill should usually wait until they recover before getting PCV13. Your health care provider can give you more information. 4. Risks of a vaccine reaction     Redness, swelling, pain, or tenderness where the shot is given, and fever, loss of appetite, fussiness (irritability), feeling tired, headache, and chills can happen after PCV13. 8 [facility] children may be at increased risk for seizures caused by fever after PCV13 if it is administered at the same time as inactivated influenza vaccine. Ask your health care provider for more information. People sometimes faint after medical procedures, including vaccination. Tell your provider if you feel dizzy or have vision changes or ringing in the ears. As with any medicine, there is a very remote chance of a vaccine causing a severe allergic reaction, other serious injury, or death. 5. What if there is a serious problem? An allergic reaction could occur after the vaccinated person leaves the clinic. If you see signs of a severe allergic reaction (hives, swelling of the face and throat, difficulty breathing, a fast heartbeat, dizziness, or weakness), call 9-1-1 and get the person to the nearest hospital.    For other signs that concern you, call your health care provider.     Adverse reactions should be reported to the Vaccine Adverse Event Reporting System (VAERS). Your health care provider will usually file this report, or you can do it yourself. Visit the VAERS website at www.vaers. hhs.gov or call 5-991.526.9544. VAERS is only for reporting reactions, and VAERS staff do not give medical advice. 6. The National Vaccine Injury Compensation Program    The [facility] Vaccine Injury Compensation Program (VICP) is a federal program that was created to compensate people who may have been injured by certain vaccines. Visit the VICP website at www.[facility].gov/vaccinecompensation or call 0-474.844.8876 to learn about the program and about filing a claim. There is a time limit to file a claim for compensation. 7. How can I learn more?  Ask your health care provider.  Call your local or state health department.  Contact the Centers for Disease Control and Prevention (CDC):  - Call 6-731.773.9130 (8-477-OFN-INFO) or  - Visit CDCs website at www.cdc.gov/vaccines    Vaccine Information Statement (Interim)  PCV13   10/30/2019  42  [employer] [employer] [id number]   Department of Health and Human Services  Centers for Disease Control and Prevention    Office Use Only      Vaccine Information Statement    Influenza (Flu) Vaccine (Inactivated or Recombinant): What You Need to Know    Many vaccine information statements are available in [place] and other languages. See www.immunize.org/vis. Hojas de información sobre vacunas están disponibles en español y en muchos otros idiomas. Visite www.immunize.org/vis. 1. Why get vaccinated? Influenza vaccine can prevent influenza (flu). Flu is a contagious disease that spreads around the [place] every year, usually between October and May. Anyone can get the flu, but it is more dangerous for some people. Infants and young children, people 72 years and older, pregnant people, and people with certain health conditions or a weakened immune system are at greatest risk of flu complications.     Pneumonia, bronchitis, sinus infections, and ear infections are examples of flu-related complications. If you have a medical condition, such as heart disease, cancer, or diabetes, flu can make it worse. Flu can cause fever and chills, sore throat, muscle aches, fatigue, cough, headache, and runny or stuffy nose. Some people may have vomiting and diarrhea, though this is more common in children than adults. In an average year, thousands of people in the [facility] die from flu, and many more are hospitalized. Flu vaccine prevents millions of illnesses and flu-related visits to the doctor each year. 2. Influenza vaccines     CDC recommends everyone 6 months and older get vaccinated every flu season. Children 6 months through 6years of age may need 2 doses during a single flu season. Everyone else needs only 1 dose each flu season. It takes about 2 weeks for protection to develop after vaccination. There are many flu viruses, and they are always changing. Each year a new flu vaccine is made to protect against the influenza viruses believed to be likely to cause disease in the upcoming flu season. Even when the vaccine doesnt exactly match these viruses, it may still provide some protection. Influenza vaccine does not cause flu. Influenza vaccine may be given at the same time as other vaccines. 3. Talk with your health care provider    Tell your vaccination provider if the person getting the vaccine:   Has had an allergic reaction after a previous dose of influenza vaccine, or has any severe, life-threatening allergies    Has ever had Guillain-Barré Syndrome (also called GBS)    In some cases, your health care provider may decide to postpone influenza vaccination until a future visit. Influenza vaccine can be administered at any time during pregnancy. People who are or will be pregnant during influenza season should receive inactivated influenza vaccine.     People with minor illnesses, such as a cold, may be vaccinated. People who are moderately or severely ill should usually wait until they recover before getting influenza vaccine. Your health care provider can give you more information. 4. Risks of a vaccine reaction     Soreness, redness, and swelling where the shot is given, fever, muscle aches, and headache can happen after influenza vaccination.  There may be a very small increased risk of Guillain-Barré Syndrome (GBS) after inactivated influenza vaccine (the flu shot). Felix Pastel children who get the flu shot along with pneumococcal vaccine (PCV13) and/or DTaP vaccine at the same time might be slightly more likely to have a seizure caused by fever. Tell your health care provider if a child who is getting flu vaccine has ever had a seizure. People sometimes faint after medical procedures, including vaccination. Tell your provider if you feel dizzy or have vision changes or ringing in the ears. As with any medicine, there is a very remote chance of a vaccine causing a severe allergic reaction, other serious injury, or death. 5. What if there is a serious problem? An allergic reaction could occur after the vaccinated person leaves the clinic. If you see signs of a severe allergic reaction (hives, swelling of the face and throat, difficulty breathing, a fast heartbeat, dizziness, or weakness), call 9-1-1 and get the person to the nearest hospital.    For other signs that concern you, call your health care provider. Adverse reactions should be reported to the Vaccine Adverse Event Reporting System (VAERS). Your health care provider will usually file this report, or you can do it yourself. Visit the VAERS website at www.vaers. hhs.gov or call 1-114.413.7877. VAERS is only for reporting reactions, and VAERS staff members do not give medical advice.     6. The National Vaccine Injury Compensation Program    The [facility] Vaccine Injury Compensation Program (VICP) is a federal program that was created to compensate people who may have been injured by certain vaccines. Claims regarding alleged injury or death due to vaccination have a time limit for filing, which may be as short as two years. Visit the VICP website at www.[facility].gov/vaccinecompensation or call 6-642.485.1043 to learn about the program and about filing a claim. 7. How can I learn more?  Ask your health care provider.  Call your local or state health department.  Visit the website of the Food and Drug Administration (FDA) for vaccine package inserts and additional information at www.fda.gov/vaccines-blood-biologics/vaccines.  Contact the Centers for Disease Control and Prevention (CDC):  - Call 8-582.670.8224 (1-800-CDC-INFO) or  - Visit CDCs influenza website at www.cdc.gov/flu. Vaccine Information Statement   Inactivated Influenza Vaccine   8/6/2021  42 ROSALIA Alas [id number]   Department of Health and Human Services  Centers for Disease Control and Prevention    Office Use Only

## 2021-09-14 NOTE — TELEPHONE ENCOUNTER
Called pt using two pt identifiers. Informed pt I found two sleep medicine centers closer to his house, one being 21 minutes away and the other being 31 minutes away. Asked pt if he had a preference on which one he went to see and pt stated he was okay with either place. Informed pt I would call them and see if one of them would accept him as a pt and call him back. Pt states understanding and has no further questions or concerns at this time. 1426 - called pt using two pt identifiers. Informed pt I was able to find a sleep medicine office closer to his home. The office is [place] [address]. Informed pt that office said they had to set up an appointment for an evaluation and then they would schedule him for a sleep study in order for insurance to cover. I also informed pt they went ahead and set him up with an appointment for September 28th, 2021 at 11 AM and to arrive at 10:30 AM to do paperwork. Asked pt if he had anything to write appointment information as well as the office address down on and pt [place] and asked if I could call him tomorrow with the information so he could write it down. Told pt I would give him a call tomorrow to provide him the information. Asked pt if he had any other questions or concerns. Pt states he has no further questions or concerns at this time. 9/15/21:  1416 - attempted to call pt, pt answered but service was cutting in and out. Will attempt to call pt back. 1419 - attempted to call pt back d/t bad connection on last call. Pt did not answer, left VM. Will try again a little later today. 9/16/21:  1155 - called pt using two pt identifiers. Called pt to provide him with his sleep study appointment information. Informed pt his appointment is with DAVID ZHONG Formerly named [facility] in [place], [place], address is [address], [address] [facility] [place], [address] [place] [address], their phone number is [phone].  His appointment is on September 28, 2021 at 11:00 AM and he needed to arrive at 10:30 to fill out paper work. Pt states understanding and has no further questions or concerns at this time.     Chaparrita Huynh RN

## 2021-09-14 NOTE — PROGRESS NOTES
[facility] in [address] Note    Patient name: Radha Stewart  Patient : 1980  Patient MRN: [id number]  Date of service: 21      PATIENT CARE TEAM:  Patient Care Team:  None as PCP - Madonna Wray MD (Cardiology)       CHIEF COMPLAINT:    Chief Complaint   Patient presents with    CHF          PLAN OF CARE:   · Patient with severe cardiomyopathy and advanced heart failure symptoms, unable to tolerate GDMT due to hypotension,   · If no recovery of LVEF we will initiate workup for heart transplant/LVAD - closest transplant site would be [facility] and we will facilitate referral if needed  · Importance of abstinence from substance use was previously discussed with patient and mother; Chris Saucedo will need 6 months period substance free to be considered for cardiac replacement therapy and formal enrollment in counseling and treatment program if addiction disorder was identified. · Will check  cMRI based on these results will schedule RHC for further workup of his cardiomyopathy   · Based on cMRI will determine if he can stop/continue anticoagulation, will require ICD placement     RECOMMENDATIONS:  Beta-blocker: Continue coreg 3.125mg BID, dose decreased d/t hypotension/aTTR  Continue current dose of entresto one/half tablet of 24/26mg twice daily; no uptitration due to aTTR  Continue current dose of spironolactone 25mg daily  Continue lasix 40mg po daily  Continue allopurinol 100 mg PO daily, uric acid level 7.6  Chronic anticoagulation with warfarin; Wants to see if he can transfer his INR check to his PCP office since it is closer  Does not take ASA or Statin  Pt had initial visit with Dr. Medardo Lopez to schedule sleep study, has not followed up due to transportation issues (pt lives 3 hrs away). Will refer pt to sleep center closer to home to complete sleep study.    Invitae positive for aTTR; previously d/w patient and Mother; information for genetic counseling provided  PYP negative for cardiac amyloid  Coxsackie ab and Parvo positive  Labs today   Repeat 6MWT today  Echocardiogram due for repeat, order placed, will try to send this to [facility] [place] closer to pt's home. Reinforced heart healthy, low salt diet  Reinforced appropriate fluid intake of 6 x 8oz glasses of water per day  Counseled on importance of THC wean; concern re: addiction disorder and provided with counseling options- no UDS today, patient acknowledges current use   Document in diary BP/HR and daily weights  Discussed LifeVest, pt had it previously, but returned it due to not wearing- pt reports it is too uncomfortable and he does not want to wear it.   Discussed risk of SCD- pt still declines LifeVest  Follow-up with primary cardiologist  Follow-up with EP cardiologist  Follow-up with PCP  Pt asking for flu and pneumonia vaccines today, will administer in clinic  Counseled HFSA recommends COVID vaccination for HF patients      Return to [address] in 3 months (virtual) with NP/MD        IMPRESSION:  Shortness of breath  Chronic systolic heart failure  · Stage C, NYHA class IIIA symptoms  · Patient diagnosed with heart failure 3 years ago (per patient report, no records)  · Reportedly non-ischemic cardiomyopathy, LVEF 10%  · H/o chronic troponin elevation  · No record of C   LV thrombus 2.7 x 3cm (by echo 9/2020)  · H/o renal infarcts  · H/o splenic infarcts  · Chronic anticoagulation with coumadin  At risk for sudden cardiac death  · Lifevest  Cardiac risk factors  · HTN  · HL  · Former tobacco use, 1.25 pack years, quit 2015  · Alcohol use, 5-6 drinks >4 times weekly, quit 9/2020  · Marijuana, daily  CKD, stage 3 (baseline Cr 2.3)  Bronchitis  Anxiety      CARDIAC IMAGING:  TTE (2/17/21) LVEF 21%, no mention of LV thrombus  Echo (9/16/20) LVEF 10%, LVEDD 5.4cm, LV thrombus 2.7x 3cm, reduced RV function, mild PAH  EKG (9/19/20) sinus tachycardia 102bpm, QRS 95ms  [facility] no records  CMRI- pending     HEMODYNAMICS:  RHC not done  CPEST not done    6MW   6 Min Walk Report 9/14/2021 3/17/2021 2/17/2021   (PRE) HR 81 88 70   (PRE) O2 Sat 98 98 98   (POST) HR 79 94 108   (POST) O2 Sat 97 85 96   Distance in Meters 400.66 362.1 369.75       OTHER IMAGING:  CXR  CT chest not done    HISTORY OF PRESENT ILLNESS:  I had the pleasure of seeing Deepali Guerra in [facility] at [facility] in [place]. Briefly, Deepali Guerra is a 42 y.o. male with h/o tobacco (quit 2015), alcohol (quit 9/2020) and THC use (daily) and strong family history of several members with early cardiomyopathy and cardiac deaths. Patient presented with chronic systolic heart failure, stage C, NYHA class IIIA symptoms, states was diagnosed with heart failure 3 years ago (per patient report, no records). He was off medications for several months. Patient was then admitted with acute heart failure, reportedly non-ischemic cardiomyopathy, LVEF 10% (no record of [facility]), LV thrombus 2.7 x 3cm (by echo 9/2020) c/b splenic and renal infarcts started on chronic anticoagulation with coumadin. [facility] and [facility] to [facility]     Patient lives 2.5 hours from [place], he lives closer to [place] but for the next couple of months would like to follow with us. If needs heart transplant [facility] would be the closer site. INTERVAL HISTORY:  Today, patient presents for routine clinic visit. he reports doing well.  he is able to do all activities as he wants without limitation or significant HF symptoms. he walked to our clinic from parking lot without having to slow down or stop. he can walk more than one block without symptoms of fatigue or shortness of breath or chest pain. he can walk one flight of stairs without symptoms of fatigue or shortness of breath or chest pain. Patient can perform home activities without problem.  he denies other cardiac symptoms such as chest pain or leg pain with walking. Patient denies symptoms of volume overload or leg edema. Reports a good appetite. he denies abdominal bloating, nausea or early satiety. Patient's weight remained stable. he denies orthopnea, PND or nocturia. Denies irregular heart rate or palpitations. No presyncope or syncope. LifeVest previously, but pt returned it due to discomfort of wearing it.      he is compliant with fluid restriction and taking medications as prescribed. Patient manages his own medications. REVIEW OF SYSTEMS:  Review of Systems   Constitutional: Negative for chills and fever. Respiratory: Negative for cough and shortness of breath. Cardiovascular: Negative for chest pain, palpitations, orthopnea and leg swelling. Gastrointestinal: Negative for abdominal pain, constipation, diarrhea, nausea and vomiting. Neurological: Negative for dizziness, focal weakness and weakness. PHYSICAL EXAM:  Visit Vitals  /78 (BP 1 Location: Left arm, BP Patient Position: Sitting, BP Cuff Size: Adult)   Pulse 81   Temp 98.7 °F (37.1 °C) (Oral)   Resp 12   Ht 5' 9\" (1.753 m)   Wt 175 lb 6.4 oz (79.6 kg)   SpO2 98%   BMI 25.90 kg/m²     Physical Exam  Vitals reviewed. Constitutional:       General: He is not in acute distress. Appearance: Normal appearance. HENT:      Head: Normocephalic and atraumatic. Eyes:      Conjunctiva/sclera: Conjunctivae normal.      Pupils: Pupils are equal, round, and reactive to light. Neck:      Vascular: No hepatojugular reflux or JVD. Cardiovascular:      Rate and Rhythm: Normal rate and regular rhythm. Pulses: Normal pulses. Heart sounds: Normal heart sounds. No murmur heard. No friction rub. No gallop. Pulmonary:      Effort: Pulmonary effort is normal. No respiratory distress. Breath sounds: Normal breath sounds. Abdominal:      General: Abdomen is flat. There is no distension. Palpations: Abdomen is soft. Tenderness: There is no abdominal tenderness. Musculoskeletal:      Cervical back: Neck supple. Right lower leg: No edema. Left lower leg: No edema. Skin:     General: Skin is warm and dry. Capillary Refill: Capillary refill takes less than 2 seconds. Neurological:      General: No focal deficit present. Mental Status: He is alert and oriented to person, place, and time. Psychiatric:         Mood and Affect: Mood normal.         Behavior: Behavior normal.         Thought Content: Thought content normal.         Judgment: Judgment normal.          PAST MEDICAL HISTORY:  No past medical history on file. PAST SURGICAL HISTORY:  No past surgical history on file. FAMILY HISTORY:  No family history on file. SOCIAL HISTORY:  Social History     Socioeconomic History    Marital status: SINGLE     Spouse name: Not on file    Number of children: Not on file    Years of education: Not on file    Highest education level: Not on file   Tobacco Use    Smoking status: Former Smoker     Types: Cigarettes     Quit date: 2012     Years since quittin.7    Smokeless tobacco: Never Used   Substance and Sexual Activity    Alcohol use: Yes    Drug use: Never   Other Topics Concern     Social Determinants of Health     Financial Resource Strain:     Difficulty of Paying Living Expenses:    Food Insecurity:     Worried About Running Out of Food in the Last Year:     [address] in the Last Year:    Transportation Needs:     Lack of Transportation (Medical):      Lack of Transportation (Non-Medical):    Physical Activity:     Days of Exercise per Week:     Minutes of Exercise per Session:    Stress:     Feeling of Stress :    Social Connections:     Frequency of Communication with Friends and Family:     Frequency of Social Gatherings with Friends and Family:     Attends Taoism Services:     Active Member of Clubs or Organizations:     Attends Club or Organization Meetings:     Marital Status:        LABORATORY RESULTS:  No flowsheet data found. ALLERGY:  No Known Allergies     CURRENT MEDICATIONS:    Current Outpatient Medications:     pantoprazole (PROTONIX) 20 mg tablet, TAKE 2 TABLETS BY MOUTH ONCE DAILY, Disp: 60 Tablet, Rfl: 2    furosemide (LASIX) 40 mg tablet, Take 1 Tablet by mouth daily. , Disp: 60 Tablet, Rfl: 2    potassium chloride (K-DUR, KLOR-CON) 20 mEq tablet, Take 0.5 Tablets by mouth daily. May take an additional 20 mEq daily PRN as directed by Edilberto aJime 1721., Disp: 30 Tablet, Rfl: 2    carvediloL (COREG) 3.125 mg tablet, Take 1 Tablet by mouth two (2) times a day., Disp: 60 Tablet, Rfl: 2    magnesium oxide (MAG-OX) 400 mg tablet, Take 1 Tablet by mouth daily. , Disp: 30 Tablet, Rfl: 1    sacubitriL-valsartan (Entresto) 24-26 mg tablet, Take 0.5 Tablets by mouth two (2) times a day. Take 1/2 tab two times a day., Disp: 30 Tablet, Rfl: 2    spironolactone (ALDACTONE) 25 mg tablet, Take 1/2 tab daily, Disp: 30 Tablet, Rfl: 2    allopurinoL (ZYLOPRIM) 100 mg tablet, Take 1 Tab by mouth daily. , Disp: 30 Tab, Rfl: 5    warfarin (COUMADIN) 7.5 mg tablet, Take 7.5 mg by mouth daily. Managed by Dr. Tari Paige not been in over a month  Pt states he has not seen Dr. Santos Grant in a month, Disp: , Rfl:       Jerel Delvalle.  Samuel Cleaning, MSN, AGACNP-BC  [address] [place] [address], [address]  Phone: [phone]  Fax: [phone]    PATIENT CARE TEAM:  Patient Care Team:  None as PCP - Suhas Fernandez MD (Cardiology)

## 2021-09-15 LAB
ALBUMIN SERPL-MCNC: 3.9 G/DL (ref 3.5–5)
ALBUMIN/GLOB SERPL: 1 {RATIO} (ref 1.1–2.2)
ALP SERPL-CCNC: 224 U/L (ref 45–117)
ALT SERPL-CCNC: 68 U/L (ref 12–78)
ANION GAP SERPL CALC-SCNC: 5 MMOL/L (ref 5–15)
AST SERPL-CCNC: 96 U/L (ref 15–37)
BILIRUB SERPL-MCNC: 0.3 MG/DL (ref 0.2–1)
BNP SERPL-MCNC: 302 PG/ML
BUN SERPL-MCNC: 14 MG/DL (ref 6–20)
BUN/CREAT SERPL: 11 (ref 12–20)
CALCIUM SERPL-MCNC: 9 MG/DL (ref 8.5–10.1)
CHLORIDE SERPL-SCNC: 106 MMOL/L (ref 97–108)
CO2 SERPL-SCNC: 27 MMOL/L (ref 21–32)
CREAT SERPL-MCNC: 1.28 MG/DL (ref 0.7–1.3)
GLOBULIN SER CALC-MCNC: 4.1 G/DL (ref 2–4)
GLUCOSE SERPL-MCNC: 79 MG/DL (ref 65–100)
INR PPP: 1.2 (ref 0.9–1.1)
MAGNESIUM SERPL-MCNC: 2.4 MG/DL (ref 1.6–2.4)
POTASSIUM SERPL-SCNC: 4.4 MMOL/L (ref 3.5–5.1)
PROT SERPL-MCNC: 8 G/DL (ref 6.4–8.2)
PROTHROMBIN TIME: 12.6 SEC (ref 9–11.1)
SODIUM SERPL-SCNC: 138 MMOL/L (ref 136–145)
URATE SERPL-MCNC: 6.3 MG/DL (ref 3.5–7.2)

## 2021-10-28 DIAGNOSIS — I50.9 CONGESTIVE HEART FAILURE, UNSPECIFIED HF CHRONICITY, UNSPECIFIED HEART FAILURE TYPE (HCC): ICD-10-CM

## 2021-10-28 DIAGNOSIS — M10.00 IDIOPATHIC GOUT, UNSPECIFIED CHRONICITY, UNSPECIFIED SITE: ICD-10-CM

## 2021-10-28 RX ORDER — ALLOPURINOL 100 MG/1
TABLET ORAL
Qty: 30 TABLET | Refills: 5 | Status: SHIPPED | OUTPATIENT
Start: 2021-10-28 | End: 2022-05-16 | Stop reason: SDUPTHER

## 2021-11-16 DIAGNOSIS — M10.9 GOUT, UNSPECIFIED CAUSE, UNSPECIFIED CHRONICITY, UNSPECIFIED SITE: ICD-10-CM

## 2021-11-16 DIAGNOSIS — I50.9 CONGESTIVE HEART FAILURE, UNSPECIFIED HF CHRONICITY, UNSPECIFIED HEART FAILURE TYPE (HCC): ICD-10-CM

## 2021-11-16 RX ORDER — PANTOPRAZOLE SODIUM 20 MG/1
TABLET, DELAYED RELEASE ORAL
Qty: 60 TABLET | Refills: 2 | Status: SHIPPED | OUTPATIENT
Start: 2021-11-16 | End: 2022-03-29

## 2021-11-16 RX ORDER — CARVEDILOL 3.12 MG/1
TABLET ORAL
Qty: 60 TABLET | Refills: 2 | Status: SHIPPED | OUTPATIENT
Start: 2021-11-16 | End: 2022-03-01

## 2021-11-16 RX ORDER — LANOLIN ALCOHOL/MO/W.PET/CERES
CREAM (GRAM) TOPICAL
Qty: 30 TABLET | Refills: 1 | Status: SHIPPED | OUTPATIENT
Start: 2021-11-16 | End: 2022-01-27

## 2021-11-16 RX ORDER — SACUBITRIL AND VALSARTAN 24; 26 MG/1; MG/1
TABLET, FILM COATED ORAL
Qty: 60 TABLET | Refills: 2 | Status: SHIPPED | OUTPATIENT
Start: 2021-11-16 | End: 2022-10-19 | Stop reason: SDUPTHER

## 2021-11-27 DIAGNOSIS — I50.9 CONGESTIVE HEART FAILURE, UNSPECIFIED HF CHRONICITY, UNSPECIFIED HEART FAILURE TYPE (HCC): ICD-10-CM

## 2021-11-27 DIAGNOSIS — M10.9 GOUT, UNSPECIFIED CAUSE, UNSPECIFIED CHRONICITY, UNSPECIFIED SITE: ICD-10-CM

## 2021-11-29 RX ORDER — SPIRONOLACTONE 25 MG/1
TABLET ORAL
Qty: 30 TABLET | Refills: 2 | Status: SHIPPED | OUTPATIENT
Start: 2021-11-29 | End: 2022-04-13

## 2021-12-29 ENCOUNTER — TELEPHONE (OUTPATIENT)
Dept: CARDIOLOGY CLINIC | Age: 41
End: 2021-12-29

## 2022-01-06 ENCOUNTER — VIRTUAL VISIT (OUTPATIENT)
Dept: CARDIOLOGY CLINIC | Age: 42
End: 2022-01-06
Payer: MEDICAID

## 2022-01-06 ENCOUNTER — TELEPHONE (OUTPATIENT)
Dept: CARDIOLOGY CLINIC | Age: 42
End: 2022-01-06

## 2022-01-06 DIAGNOSIS — I42.8 NICM (NONISCHEMIC CARDIOMYOPATHY) (HCC): ICD-10-CM

## 2022-01-06 DIAGNOSIS — I50.22 CHRONIC SYSTOLIC CONGESTIVE HEART FAILURE (HCC): Primary | ICD-10-CM

## 2022-01-06 DIAGNOSIS — E55.9 VITAMIN D INSUFFICIENCY: ICD-10-CM

## 2022-01-06 PROCEDURE — 99443 PR PHYS/QHP TELEPHONE EVALUATION 21-30 MIN: CPT | Performed by: NURSE PRACTITIONER

## 2022-01-06 NOTE — PROGRESS NOTES
[address] [facility] in [facility], [address] Note    Patient name: Jason Toscano  Patient : 1980  Patient MRN: [id number]  Date of service: 22    Jason Toscano, who was evaluated through a patient-initiated, synchronous (real-time) audio only encounter, and/or her healthcare decision maker, is aware that it is a billable service, with coverage as determined by his insurance carrier. He provided verbal consent to proceed: Yes. He has not had a related appointment within my department in the past 7 days or scheduled within the next 24 hours. CHIEF COMPLAINT:    Chief Complaint   Patient presents with    CHF        PLAN OF CARE:   · HFrEF d/t NICM, unable to tolerate optimal GDMT due to hypotension   · Pt with slow recovery of LVEF and minimal HF symptoms; referral for LVAD/OHT not done previously d/t pt's active daily marijuana use, and concern for addiction disorder  · Importance of abstinence from substance use was previously discussed with patient and mother; Sigifredo Rivas will need 6 months period substance free to be considered for cardiac replacement therapy and formal enrollment in counseling and treatment program if addiction disorder is identified. · Invitae positive for aTTR; PYP and cMRI negative    RECOMMENDATIONS:  Beta-blocker: Continue coreg 3.125mg BID, titration limited d/t hypotension  ACEi/ARB/ARNi: continue Entresto half tablet of 24/26mg twice daily; no uptitration due to hypotension  Continue current dose of spironolactone 25mg daily  Continue lasix 40mg po daily  Continue allopurinol 100 mg PO daily, uric acid level 7.6  Was on chronic anticoagulation with warfarin for LV thrombus- was stopped by PCP about 2 months ago per pt. Will request records from PCP.      Does not take ASA or Statin  Pt had initial visit with Dr. Reva Allen to schedule sleep study, has not followed up due to transportation issues (pt lives 3 hrs away). Referred pt to sleep center closer to home to complete sleep study- needs to schedule. Labs ordered, to be sent to lab close to pt  Repeat 6MWT quarterly  Echocardiogram due for repeat, order placed, will try to send this to [facility] closer to pt's home. Reinforced heart healthy, low salt diet  Reinforced appropriate fluid intake of 6 x 8oz glasses of water per day  Counseled on importance of THC wean; concern re: addiction disorder; pt has seen counselor; no UDS ordered, patient acknowledges current use   Document in diary BP/HR and daily weights  Follow-up with primary cardiologist  Follow-up with EP cardiologist  Follow-up with PCP  Pt up to date on flu and PNA vaccines, advised to stay up to date with COVID vaccine including booster. Return to [facility] in 3 months in clinic with NP/MD         IMPRESSION:  Shortness of breath  Chronic systolic heart failure  · Stage C, NYHA class IIIA symptoms  · Patient diagnosed with heart failure 3 years ago (per patient report, no records)  · Reportedly non-ischemic cardiomyopathy, LVEF 10% at diagnosis, now improved to 34%  · H/o chronic troponin elevation  · No record of LHC   · Invitae + aTTR; PYP (01/21) neg; cMRI neg for amyloid  · +Coxsackie and Parvovirus Ab  LV thrombus 2.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 Biplane method used to measure ejection fraction. Severely dilated left ventricle. Upper normal wall thickness. Moderate-to-severely reduced systolic function. Severe (grade 3) left ventricular diastolic dysfunction.   · LA: Moderately dilated left atrium. · RV: Mildly reduced systolic function. · MV: Mild mitral valve regurgitation is present. · IAS: There was no shunting at baseline. · PA: Mild pulmonary hypertension. Pulmonary arterial systolic pressure is 35 mmHg. · Contrast used: DEFINITY. Echo (9/16/20) LVEF 10%, LVEDD 5.4cm, LV thrombus 2.7x 3cm, reduced RV function, mild PAH    EKG (9/19/20) sinus tachycardia 102bpm, QRS 95ms    [facility] no records    cMRI (9/7/21)  1. Mildly dilated left ventricle by 3-D volumetric assessment with moderate left  ventricular systolic dysfunction. Mild global hypokinesis with regional  variation. Moderate hypokinesis of the base to mid and distal anterior and  anteroseptal wall. Moderate hypokinesis of the mid to distal inferolateral wall. 3-D LVEF 34%. 2. Normal right ventricular size and systolic function. RVEF 60%. 3. No significant valvular disease. 4. On EGE and LGE study, there is patchy areas of enhancement present. There is  an area of mid wall septal stripe enhancement of the base to mid septal wall. There is a focal area of mid myocardial enhancement of the mid inferior wall. There is a focal area of subepicardial enhancement of the base to mid  inferolateral wall. All these findings suggest possibility of an old healed  myocarditis. These features are not consistent with infiltrative cardiac  sarcoidosis or cardiac amyloidosis. There is no features of recent or old  myocardial infarction. 5. Normal pleura and pericardium. There is no significant effusions.     PYP (1/21/21): PYP scan is not suggestive for ATTR cardiac amyloidosis based on the H:CL ratio of 1.0 and semiquantitative score of 1.      6MW   6 Min Walk Report 9/14/2021 3/17/2021 2/17/2021   (PRE) HR 81 88 70   (PRE) O2 Sat 98 98 98   (POST) HR 79 94 108   (POST) O2 Sat 97 85 96   Distance in Meters 400.66 362.1 369.75         HISTORY OF PRESENT ILLNESS:  I had the pleasure of seeing Dulce Swartzul in [facility] in [facility]. Briefly, Artur Kilgore is a 42 y.o. male with h/o tobacco (quit 2015), alcohol (quit 9/2020) and THC use (daily) and strong family history of several members with early cardiomyopathy and cardiac deaths. Patient presented with chronic systolic heart failure, stage C, NYHA class IIIA symptoms, states was diagnosed with heart failure 3 years ago (per patient report, no records). He was off medications for several months. Patient was then admitted with acute heart failure, reportedly non-ischemic cardiomyopathy, LVEF 10% (no record of [facility]), LV thrombus 2.7 x 3cm (by echo 9/2020) c/b splenic and renal infarcts started on chronic anticoagulation with coumadin. Discharged home with lifevest and referred to [facility]     Patient lives 2.5 hours from [facility], he lives closer to [place] but for the next couple of months would like to follow with us. If needs heart transplant [facility] would be the closer site. INTERVAL HISTORY:  Today, patient presents for routine clinic visit. he reports doing well. He is able to walk more, and has been more consistently walking. He has seen a counselor. Patient can perform home activities without problem. he denies other cardiac symptoms such as chest pain or leg pain with walking. Patient denies symptoms of volume overload or leg edema. Reports a good appetite. he denies abdominal bloating, nausea or early satiety. Patient's weight remained stable. he denies orthopnea, PND or nocturia. Denies irregular heart rate or palpitations. No presyncope or syncope. LifeVest previously, but pt returned it due to discomfort of wearing it.      he is compliant with fluid restriction and taking medications as prescribed. Patient manages his own medications. REVIEW OF SYSTEMS:  Review of Systems   Constitutional: Negative for chills and fever. Respiratory: Negative for cough and shortness of breath.     Cardiovascular: Negative for chest pain, palpitations, orthopnea and leg swelling. Gastrointestinal: Negative for abdominal pain, constipation, diarrhea, nausea and vomiting. Neurological: Negative for dizziness, focal weakness and weakness. PHYSICAL EXAM:  Patient-Reported Vitals 1/6/2022   Patient-Reported Weight 178 lbs   Patient-Reported Pulse 98   Patient-Reported Systolic  981   Patient-Reported Diastolic 78      Physical Exam  Vitals reviewed. Constitutional:       General: He is not in acute distress. Appearance: Normal appearance. HENT:      Head: Normocephalic and atraumatic. Eyes:      Conjunctiva/sclera: Conjunctivae normal.      Pupils: Pupils are equal, round, and reactive to light. Neck:      Vascular: No hepatojugular reflux or JVD. Cardiovascular:      Rate and Rhythm: Normal rate and regular rhythm. Pulses: Normal pulses. Heart sounds: Normal heart sounds. No murmur heard. No friction rub. No gallop. Pulmonary:      Effort: Pulmonary effort is normal. No respiratory distress. Breath sounds: Normal breath sounds. Abdominal:      General: Abdomen is flat. There is no distension. Palpations: Abdomen is soft. Tenderness: There is no abdominal tenderness. Musculoskeletal:      Cervical back: Neck supple. Right lower leg: No edema. Left lower leg: No edema. Skin:     General: Skin is warm and dry. Capillary Refill: Capillary refill takes less than 2 seconds. Neurological:      General: No focal deficit present. Mental Status: He is alert and oriented to person, place, and time. Psychiatric:         Mood and Affect: Mood normal.         Behavior: Behavior normal.         Thought Content: Thought content normal.         Judgment: Judgment normal.          PAST MEDICAL HISTORY:  No past medical history on file. PAST SURGICAL HISTORY:  No past surgical history on file. FAMILY HISTORY:  No family history on file.     SOCIAL HISTORY:  Social History     Socioeconomic History    Marital status: SINGLE   Tobacco Use    Smoking status: Former Smoker     Types: Cigarettes     Quit date: 1/1/2012     Years since quitting: 10.0    Smokeless tobacco: Never Used   Substance and Sexual Activity    Alcohol use: Yes    Drug use: Never       LABORATORY RESULTS:  No flowsheet data found. ALLERGY:  No Known Allergies     CURRENT MEDICATIONS:    Current Outpatient Medications:     spironolactone (ALDACTONE) 25 mg tablet, TAKE 1/2 TABLET BY MOUTH ONCE A DAY, Disp: 30 Tablet, Rfl: 2    magnesium oxide (MAG-OX) 400 mg tablet, TAKE 1 TABLET BY MOUTH ONCE A DAY, Disp: 30 Tablet, Rfl: 1    pantoprazole (PROTONIX) 20 mg tablet, TAKE 2 TABLETS BY MOUTH ONCE DAILY, Disp: 60 Tablet, Rfl: 2    Entresto 24-26 mg tablet, TAKE 1/2 TABLET BY MOUTH TWICE DAILY, Disp: 60 Tablet, Rfl: 2    carvediloL (COREG) 3.125 mg tablet, TAKE 1 TABLET BY MOUTH TWICE A DAY, Disp: 60 Tablet, Rfl: 2    allopurinoL (ZYLOPRIM) 100 mg tablet, TAKE 1 TABLET BY MOUTH DAILY. , Disp: 30 Tablet, Rfl: 5    furosemide (LASIX) 40 mg tablet, Take 1 Tablet by mouth daily. , Disp: 60 Tablet, Rfl: 2    potassium chloride (K-DUR, KLOR-CON) 20 mEq tablet, Take 0.5 Tablets by mouth daily. May take an additional 20 mEq daily PRN as directed by Edilberto Jaime 1721., Disp: 30 Tablet, Rfl: 2    warfarin (COUMADIN) 7.5 mg tablet, Take 7.5 mg by mouth daily. Managed by Dr. Fly Osipna not been in over a month  Pt states he has not seen Dr. Gabriela Arias in a month (Patient not taking: Reported on 12/15/2021), Disp: , Rfl:       On this date 01/06/2022 I have spent 48 minutes reviewing previous notes, test results and face to face (virtual) with the patient discussing the diagnosis and importance of compliance with the treatment plan as well as documenting on the day of the visit. Steve Malik, MSN, AGACNP-BC  [address], [address]  Phone: [phone]  Fax: [phone]    PATIENT CARE TEAM:  Patient Care Team:  None as PCP - Mathieu Fontenot MD (Cardiology)

## 2022-01-06 NOTE — TELEPHONE ENCOUNTER
Sent doxy link x3 to pt's mobile number, confirmed by RN during check-in for virtual visit. Attempted to call pt- went straight to voicemail. Left VM requesting return call if pt having issues connecting to doxy.

## 2022-01-06 NOTE — PATIENT INSTRUCTIONS
Medication changes:    none      Testing Ordered: An order for an echocardiogram has been placed to be done in the next couple of weeks. You will be receiving an automated call from Coordination of Care to schedule this test. If you are unavailable to receive the call or would like to contact coordination of care yourself you may contact [phone] to schedule. You will need to contact coordination of care yourself if you miss their calls as they will only make 3 attempts to reach you. Please call and schedule your sleep study    Labs were ordered for you. Please take the lab slips provided to any [facility] location to have these drawn. For virtual visits, the nurse will follow up with you about these lab orders- we can either mail the lab slips to your home or fax them to a [facility] location convenient to you. Other Recommendations:      Ensure your drinking an adequate amount of water with a goal of 6-8 eight ounce glasses (1.5-2 liters) of fluid daily. Your urine should be clear and light yellow straw colored. If your blood pressure begins to consistently run below 90/60 and/or you begin to experience dizziness or lightheadedness, please contact the [facility] [address] at [phone]. Please monitor your weights daily upon waking and after using the bathroom. Keep a written records of your weights and bring to your next appointment. If you have a weight gain of 3 or more pounds overnight OR 5 or more pounds in one week please contact our office. Follow up in 3 months with [facility]      Thank you for allowing us the privilege of being a part of your healthcare team! Please do not hesitate to contact our office at [phone] with any questions or concerns. Virtual Heart Failure [address] invites you to learn more about heart failure and to share your questions, ideas, and experiences with others.  Each month, the Heart Failure Support Group features a new educational topic and a guest speaker, followed by an interactive discussion. Our Heart Failure Nurse Navigator will moderate each session. You will be able to participate by phone, tablet or computer through [address] [place] [address]. This support group takes place on the 3rd Thursday of each month from 6:00-7:30PM. All individuals living with heart failure and their caregivers are welcome to join. If you are interested in participating, please contact us at [email or web address] and you will be sent the link to join the [employer].

## 2022-01-07 ENCOUNTER — TELEPHONE (OUTPATIENT)
Dept: CARDIOLOGY CLINIC | Age: 42
End: 2022-01-07

## 2022-01-27 RX ORDER — LANOLIN ALCOHOL/MO/W.PET/CERES
CREAM (GRAM) TOPICAL
Qty: 30 TABLET | Refills: 1 | Status: SHIPPED | OUTPATIENT
Start: 2022-01-27 | End: 2022-04-18

## 2022-03-01 DIAGNOSIS — I50.9 CONGESTIVE HEART FAILURE, UNSPECIFIED HF CHRONICITY, UNSPECIFIED HEART FAILURE TYPE (HCC): ICD-10-CM

## 2022-03-01 RX ORDER — CARVEDILOL 3.12 MG/1
TABLET ORAL
Qty: 60 TABLET | Refills: 2 | Status: SHIPPED | OUTPATIENT
Start: 2022-03-01 | End: 2022-07-26

## 2022-03-18 PROBLEM — E55.9 VITAMIN D DEFICIENCY: Status: ACTIVE | Noted: 2021-02-17

## 2022-03-18 PROBLEM — F12.90 MARIJUANA USE: Status: ACTIVE | Noted: 2021-02-17

## 2022-03-19 PROBLEM — E85.82 WILD-TYPE TRANSTHYRETIN-RELATED (ATTR) AMYLOIDOSIS (HCC): Status: ACTIVE | Noted: 2021-02-17

## 2022-03-20 PROBLEM — I50.20 NYHA CLASS 3 HEART FAILURE WITH REDUCED EJECTION FRACTION (HCC): Status: ACTIVE | Noted: 2021-02-17

## 2022-03-20 PROBLEM — R06.09 DOE (DYSPNEA ON EXERTION): Status: ACTIVE | Noted: 2021-02-17

## 2022-03-20 PROBLEM — M10.00 IDIOPATHIC GOUT: Status: ACTIVE | Noted: 2021-02-17

## 2022-03-29 RX ORDER — PANTOPRAZOLE SODIUM 20 MG/1
TABLET, DELAYED RELEASE ORAL
Qty: 60 TABLET | Refills: 0 | Status: SHIPPED | OUTPATIENT
Start: 2022-03-29 | End: 2022-06-27

## 2022-03-29 NOTE — TELEPHONE ENCOUNTER
Requested Prescriptions     Signed Prescriptions Disp Refills    pantoprazole (PROTONIX) 20 mg tablet 60 Tablet 0     Sig: TAKE 2 TABLETS BY MOUTH ONCE DAILY     Authorizing Provider: Oliva Rea     Ordering User: Ag VILLEDA     Attempted to call patient to notify that further refills to be done by PCP.  No answer and no voicemail

## 2022-04-06 ENCOUNTER — TELEPHONE (OUTPATIENT)
Dept: CARDIOLOGY CLINIC | Age: 42
End: 2022-04-06

## 2022-04-13 DIAGNOSIS — M10.9 GOUT, UNSPECIFIED CAUSE, UNSPECIFIED CHRONICITY, UNSPECIFIED SITE: ICD-10-CM

## 2022-04-13 DIAGNOSIS — I50.9 CONGESTIVE HEART FAILURE, UNSPECIFIED HF CHRONICITY, UNSPECIFIED HEART FAILURE TYPE (HCC): ICD-10-CM

## 2022-04-13 RX ORDER — SPIRONOLACTONE 25 MG/1
TABLET ORAL
Qty: 30 TABLET | Refills: 2 | Status: SHIPPED | OUTPATIENT
Start: 2022-04-13 | End: 2022-04-26 | Stop reason: SDUPTHER

## 2022-04-18 RX ORDER — LANOLIN ALCOHOL/MO/W.PET/CERES
CREAM (GRAM) TOPICAL
Qty: 30 TABLET | Refills: 1 | Status: SHIPPED | OUTPATIENT
Start: 2022-04-18 | End: 2022-07-26

## 2022-04-25 ENCOUNTER — TELEPHONE (OUTPATIENT)
Dept: CARDIOLOGY CLINIC | Age: 42
End: 2022-04-25

## 2022-04-25 NOTE — TELEPHONE ENCOUNTER
Telephone Call RE:  Appointment reminder     Outcome:     [] Patient confirmed appointment   [] Patient rescheduled appointment for    [x] Unable to reach  [] Left message             [x] Other:  Voicemail Full    ---------------------             [] Screened for [facility]

## 2022-04-26 ENCOUNTER — HOSPITAL ENCOUNTER (OUTPATIENT)
Dept: LAB | Age: 42
Discharge: HOME | End: 2022-04-26

## 2022-04-26 ENCOUNTER — OFFICE VISIT (OUTPATIENT)
Dept: CARDIOLOGY CLINIC | Age: 42
End: 2022-04-26
Payer: MEDICAID

## 2022-04-26 VITALS
BODY MASS INDEX: 28.02 KG/M2 | TEMPERATURE: 97.3 F | OXYGEN SATURATION: 94 % | HEART RATE: 67 BPM | DIASTOLIC BLOOD PRESSURE: 96 MMHG | WEIGHT: 189.2 LBS | SYSTOLIC BLOOD PRESSURE: 150 MMHG | RESPIRATION RATE: 18 BRPM | HEIGHT: 69 IN

## 2022-04-26 DIAGNOSIS — E55.9 VITAMIN D DEFICIENCY: ICD-10-CM

## 2022-04-26 DIAGNOSIS — M10.9 GOUT, UNSPECIFIED CAUSE, UNSPECIFIED CHRONICITY, UNSPECIFIED SITE: ICD-10-CM

## 2022-04-26 DIAGNOSIS — I50.9 CONGESTIVE HEART FAILURE, UNSPECIFIED HF CHRONICITY, UNSPECIFIED HEART FAILURE TYPE (HCC): ICD-10-CM

## 2022-04-26 DIAGNOSIS — R06.02 SHORTNESS OF BREATH: ICD-10-CM

## 2022-04-26 DIAGNOSIS — I42.8 NICM (NONISCHEMIC CARDIOMYOPATHY) (HCC): ICD-10-CM

## 2022-04-26 DIAGNOSIS — I50.22 CHRONIC SYSTOLIC CONGESTIVE HEART FAILURE (HCC): Primary | ICD-10-CM

## 2022-04-26 PROCEDURE — 99214 OFFICE O/P EST MOD 30 MIN: CPT | Performed by: NURSE PRACTITIONER

## 2022-04-26 PROCEDURE — 93000 ELECTROCARDIOGRAM COMPLETE: CPT | Performed by: NURSE PRACTITIONER

## 2022-04-26 RX ORDER — FUROSEMIDE 40 MG/1
40 TABLET ORAL 2 TIMES DAILY
Qty: 60 TABLET | Refills: 2 | Status: SHIPPED | OUTPATIENT
Start: 2022-04-26 | End: 2022-10-19 | Stop reason: SDUPTHER

## 2022-04-26 RX ORDER — SPIRONOLACTONE 25 MG/1
12.5 TABLET ORAL DAILY
Qty: 15 TABLET | Refills: 2 | Status: SHIPPED | OUTPATIENT
Start: 2022-04-26 | End: 2022-10-19 | Stop reason: SDUPTHER

## 2022-04-26 NOTE — PATIENT INSTRUCTIONS
Medication changes:    No medication changes    Refills for Lasix and Spironolactone sent to pharmacy    Testing Ordered:    Labs have been drawn today in clinic. We will call you with any abnormal results that require a change in medication regimen. EKG and 6 minute walk done today in clinic. Please call and schedule your echo. Other Recommendations:      Ensure your drinking an adequate amount of water with a goal of 6-8 eight ounce glasses (1.5-2 liters) of fluid daily. Your urine should be clear and light yellow straw colored. If your blood pressure begins to consistently run below 90/60 and/or you begin to experience dizziness or lightheadedness, please contact the [place] [address] at [phone]. Follow up 3 months virtually with [facility]      Please monitor your weights daily upon waking and after using the bathroom. Keep a written records of your weights and bring to your next appointment. If you have a weight gain of 3 or more pounds overnight OR 5 or more pounds in one week please contact our office. Thank you for allowing us the privilege of being a part of your healthcare team! Please do not hesitate to contact our office at [phone] with any questions or concerns. Virtual Heart Failure [address] invites you to learn more about heart failure and to share your questions, ideas, and experiences with others. Each month, the Heart Failure Support Group features a new educational topic and a guest speaker, followed by an interactive discussion. Our Heart Failure Nurse Navigator will moderate each session. You will be able to participate by phone, tablet or computer through [address] [place] [address]. This support group takes place on the 3rd Thursday of each month from 6:00-7:30PM. All individuals living with heart failure and their caregivers are welcome to join.      If you are interested in participating, please contact us at [email or web address] and you will be sent the link to join the [employer].

## 2022-04-26 NOTE — PROGRESS NOTES
[address] [facility], [facility] Note    Patient name: Jade Loo  Patient : 1980  Patient MRN: [id number]  Date of service: 22      CHIEF COMPLAINT:    Chief Complaint   Patient presents with    CHF        PLAN OF CARE:   · HFrEF d/t NICM, unable to tolerate optimal GDMT due to hypotension   · Pt with slow recovery of LVEF and minimal HF symptoms; referral for LVAD/OHT not done previously d/t pt's active daily marijuana use, and concern for addiction disorder  · Importance of abstinence from substance use was previously discussed with patient and mother; Pio Overton will need 6 months period substance free to be considered for cardiac replacement therapy and formal enrollment in counseling and treatment program if addiction disorder is identified. · Invitae positive for aTTR; PYP and cMRI negative  · Currently well compensated on GDMT      RECOMMENDATIONS:  Beta-blocker: Continue coreg 3.125mg BID, titration limited d/t hypotension  ACEi/ARB/ARNi: continue Entresto half tablet of 24/26mg twice daily; no uptitration due to hypotension  Continue current dose of spironolactone 25mg daily  Continue lasix 40mg po daily  Continue allopurinol 100 mg PO daily, uric acid level 7.6  Was on chronic anticoagulation with warfarin for LV thrombus- was stopped by PCP per pt. Does not take ASA or Statin  Pt had initial visit with Sleep Medicine, was not able to do in-office PSG d/t anxiety; pt declines further evaluation or consideration of home sleep study   Labs today in clinic   Repeat 6MWT today  Echocardiogram due for repeat, order sent to [facility] in January- will follow up and have pt schedule.    Reinforced heart healthy, low salt diet  Reinforced appropriate fluid intake of 6 x 8oz glasses of water per day  Counseled on importance of THC wean; concern re: addiction disorder; pt has seen counselor; no UDS ordered, patient acknowledges current use   Document in diary BP/HR and daily weights  Follow-up with primary cardiologist  Follow-up with EP cardiologist  Follow-up with PCP      Return to [facility] in 3 months in clinic with NP/MD         IMPRESSION:  Shortness of breath  Chronic systolic heart failure  · Stage C, NYHA class I symptoms  · Patient diagnosed with heart failure 3 years ago (per patient report, no records)  · Reportedly non-ischemic cardiomyopathy, LVEF 10% at diagnosis, now improved to 34%  · H/o chronic troponin elevation  · No record of LHC   · Invitae + aTTR; PYP (01/21) neg; cMRI neg for amyloid  · +Coxsackie and Parvovirus Ab  H/o LV thrombus 2.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 Biplane method used to measure ejection fraction. Severely dilated left ventricle. Upper normal wall thickness. Moderate-to-severely reduced systolic function. Severe (grade 3) left ventricular diastolic dysfunction. · LA: Moderately dilated left atrium. · RV: Mildly reduced systolic function. · MV: Mild mitral valve regurgitation is present. · IAS: There was no shunting at baseline. · PA: Mild pulmonary hypertension. Pulmonary arterial systolic pressure is 35 mmHg. · Contrast used: DEFINITY. Echo (9/16/20) LVEF 10%, LVEDD 5.4cm, LV thrombus 2.7x 3cm, reduced RV function, mild PAH    EKG (9/19/20) sinus tachycardia 102bpm, QRS 95ms    [facility] no records    cMRI (9/7/21)  1. Mildly dilated left ventricle by 3-D volumetric assessment with moderate left  ventricular systolic dysfunction.  Mild global hypokinesis with regional  variation. Moderate hypokinesis of the base to mid and distal anterior and  anteroseptal wall. Moderate hypokinesis of the mid to distal inferolateral wall. 3-D LVEF 34%. 2. Normal right ventricular size and systolic function. RVEF 60%. 3. No significant valvular disease. 4. On EGE and LGE study, there is patchy areas of enhancement present. There is  an area of mid wall septal stripe enhancement of the base to mid septal wall. There is a focal area of mid myocardial enhancement of the mid inferior wall. There is a focal area of subepicardial enhancement of the base to mid  inferolateral wall. All these findings suggest possibility of an old healed  myocarditis. These features are not consistent with infiltrative cardiac  sarcoidosis or cardiac amyloidosis. There is no features of recent or old  myocardial infarction. 5. Normal pleura and pericardium. There is no significant effusions. PYP (1/21/21): PYP scan is not suggestive for ATTR cardiac amyloidosis based on the H:CL ratio of 1.0 and semiquantitative score of 1.      6MW   6 Min Walk Report 4/26/2022 9/14/2021 3/17/2021 2/17/2021   (PRE) HR 74 81 88 70   (PRE) O2 Sat 98 98 98 98   (POST) HR 93 79 94 108   (POST) O2 Sat 96 97 85 96   Distance in Meters 377.34 400.66 362.1 369.75         HISTORY OF PRESENT ILLNESS:  I had the pleasure of seeing Fernandez Flores in [address] [place] [address] at [facility] in [place]. Briefly, Fernandez Flores is a 36 y.o. male with h/o tobacco (quit 2015), alcohol (quit 9/2020) and THC use (daily) and strong family history of several members with early cardiomyopathy and cardiac deaths. Patient presented with chronic systolic heart failure, stage C, NYHA class IIIA symptoms, states was diagnosed with heart failure 3 years ago (per patient report, no records). He was off medications for several months.  Patient was then admitted with acute heart failure, reportedly non-ischemic cardiomyopathy, LVEF 10% (no record of [facility]), LV thrombus 2.7 x 3cm (by echo 9/2020) c/b splenic and renal infarcts started on chronic anticoagulation with coumadin. Discharged home with lifevest and [facility]     Patient lives 2[place]5 hours from [place], he lives closer to [place] but for the next couple of months would like to follow with us. If needs heart transplant [facility] would be the closer site. INTERVAL HISTORY:  Today, patient presents for routine clinic visit. he reports doing well. He is able to walk around and do activity as he wants without issue. He walks daily to his grandparents house which is a half mil walk. He has seen a counselor. Patient can perform home activities without problem. he denies other cardiac symptoms such as chest pain or leg pain with walking. Patient denies symptoms of volume overload or leg edema. Reports a good appetite. he denies abdominal bloating, nausea or early satiety. Patient's weight remained stable. he denies orthopnea, PND or nocturia. Denies irregular heart rate or palpitations. No presyncope or syncope. LifeVest previously, but pt returned it due to discomfort of wearing it.      he is compliant with fluid restriction and taking medications as prescribed. Patient manages his own medications. REVIEW OF SYSTEMS:  Review of Systems   Constitutional: Negative for chills and fever. Respiratory: Negative for cough and shortness of breath. Cardiovascular: Negative for chest pain, palpitations, orthopnea and leg swelling. Gastrointestinal: Negative for abdominal pain, constipation, diarrhea, nausea and vomiting. Neurological: Negative for dizziness, focal weakness and weakness.         PHYSICAL EXAM:  Visit Vitals  BP (!) 150/96 (BP 1 Location: Left arm, BP Patient Position: Sitting) Comment: not taken medications today   Pulse 67   Temp 97.3 °F (36.3 °C) (Oral)   Resp 18   Ht 5' 9\" (1.753 m)   Wt 189 lb 3.2 oz (85.8 kg)   SpO2 94% BMI 27.94 kg/m²     Physical Exam  Constitutional:       General: He is not in acute distress. Appearance: Normal appearance. HENT:      Head: Normocephalic and atraumatic. Neck:      Vascular: No hepatojugular reflux or JVD. Cardiovascular:      Rate and Rhythm: Normal rate and regular rhythm. Pulses: Normal pulses. Heart sounds: Normal heart sounds. No murmur heard. No friction rub. No gallop. Pulmonary:      Effort: Pulmonary effort is normal. No respiratory distress. Breath sounds: Normal breath sounds. Abdominal:      General: Bowel sounds are normal. There is distension. Palpations: Abdomen is soft. Tenderness: There is no abdominal tenderness. Musculoskeletal:      Right lower leg: No edema. Left lower leg: No edema. Skin:     General: Skin is warm and dry. Capillary Refill: Capillary refill takes less than 2 seconds. Neurological:      General: No focal deficit present. Mental Status: He is alert and oriented to person, place, and time. Psychiatric:         Mood and Affect: Mood normal.         Behavior: Behavior normal.         Thought Content: Thought content normal.         Judgment: Judgment normal.            PAST MEDICAL HISTORY:  History reviewed. No pertinent past medical history. PAST SURGICAL HISTORY:  History reviewed. No pertinent surgical history. FAMILY HISTORY:  History reviewed. No pertinent family history. SOCIAL HISTORY:  Social History     Socioeconomic History    Marital status: SINGLE   Tobacco Use    Smoking status: Current Some Day Smoker     Types: Cigarettes     Last attempt to quit: 1/1/2012     Years since quitting: 10.3    Smokeless tobacco: Never Used    Tobacco comment: 1 cigarette q4 days per pt   Substance and Sexual Activity    Alcohol use: Yes    Drug use: Yes     Types: Marijuana       LABORATORY RESULTS:  No flowsheet data found.     ALLERGY:  No Known Allergies     CURRENT MEDICATIONS:    Current Outpatient Medications:     magnesium oxide (MAG-OX) 400 mg tablet, TAKE 1 TABLET BY MOUTH ONCE A DAY, Disp: 30 Tablet, Rfl: 1    spironolactone (ALDACTONE) 25 mg tablet, TAKE 1/2 TABLET BY MOUTH ONCE A DAY, Disp: 30 Tablet, Rfl: 2    pantoprazole (PROTONIX) 20 mg tablet, TAKE 2 TABLETS BY MOUTH ONCE DAILY, Disp: 60 Tablet, Rfl: 0    carvediloL (COREG) 3.125 mg tablet, TAKE 1 TABLET BY MOUTH TWICE A DAY, Disp: 60 Tablet, Rfl: 2    Entresto 24-26 mg tablet, TAKE 1/2 TABLET BY MOUTH TWICE DAILY, Disp: 60 Tablet, Rfl: 2    furosemide (LASIX) 40 mg tablet, Take 1 Tablet by mouth daily. (Patient taking differently: Take 40 mg by mouth two (2) times a day. Needs refill - ran out yesterday 4/25), Disp: 60 Tablet, Rfl: 2    potassium chloride (K-DUR, KLOR-CON) 20 mEq tablet, Take 0.5 Tablets by mouth daily. May take an additional 20 mEq daily PRN as directed by [facility] Disp: 30 Tablet, Rfl: 2    allopurinoL (ZYLOPRIM) 100 mg tablet, TAKE 1 TABLET BY MOUTH DAILY. (Patient not taking: Reported on 4/26/2022), Disp: 30 Tablet, Rfl: 5    warfarin (COUMADIN) 7.5 mg tablet, Take 7.5 mg by mouth daily. Managed by Dr. Melvi Martinez not been in over a month  Pt states he has not seen Dr. Pérez Burgos in a month (Patient not taking: Reported on 12/15/2021), Disp: , Rfl:         Betito Bradley.  Charlotte Reynaga, MSN, AGACNP-[address], [address]  Phone: [phone]  Fax: [phone]    PATIENT CARE TEAM:  Patient Care Team:  None as PCP - Mary Ann Mccauley MD (Cardiology)

## 2022-04-27 LAB
25(OH)D3 SERPL-MCNC: 21.3 NG/ML (ref 30–100)
ALBUMIN SERPL-MCNC: 4.7 G/DL (ref 3.5–5)
ALBUMIN/GLOB SERPL: 1.3 {RATIO} (ref 1.1–2.2)
ALP SERPL-CCNC: 182 U/L (ref 45–117)
ALT SERPL-CCNC: 43 U/L (ref 12–78)
ANION GAP SERPL CALC-SCNC: 6 MMOL/L (ref 5–15)
AST SERPL-CCNC: 48 U/L (ref 15–37)
BILIRUB SERPL-MCNC: 0.3 MG/DL (ref 0.2–1)
BNP SERPL-MCNC: 94 PG/ML
BUN SERPL-MCNC: 14 MG/DL (ref 6–20)
BUN/CREAT SERPL: 10 (ref 12–20)
CALCIUM SERPL-MCNC: 9.8 MG/DL (ref 8.5–10.1)
CHLORIDE SERPL-SCNC: 111 MMOL/L (ref 97–108)
CO2 SERPL-SCNC: 24 MMOL/L (ref 21–32)
CREAT SERPL-MCNC: 1.39 MG/DL (ref 0.7–1.3)
ERYTHROCYTE [DISTWIDTH] IN BLOOD BY AUTOMATED COUNT: 15 % (ref 11.5–14.5)
GLOBULIN SER CALC-MCNC: 3.5 G/DL (ref 2–4)
GLUCOSE SERPL-MCNC: 80 MG/DL (ref 65–100)
HCT VFR BLD AUTO: 46.5 % (ref 36.6–50.3)
HGB BLD-MCNC: 14.2 G/DL (ref 12.1–17)
MAGNESIUM SERPL-MCNC: 2.4 MG/DL (ref 1.6–2.4)
MCH RBC QN AUTO: 30.4 PG (ref 26–34)
MCHC RBC AUTO-ENTMCNC: 30.5 G/DL (ref 30–36.5)
MCV RBC AUTO: 99.6 FL (ref 80–99)
NRBC # BLD: 0 K/UL (ref 0–0.01)
NRBC BLD-RTO: 0 PER 100 WBC
PLATELET # BLD AUTO: 227 K/UL (ref 150–400)
PMV BLD AUTO: 12.2 FL (ref 8.9–12.9)
POTASSIUM SERPL-SCNC: 4.6 MMOL/L (ref 3.5–5.1)
PROT SERPL-MCNC: 8.2 G/DL (ref 6.4–8.2)
RBC # BLD AUTO: 4.67 M/UL (ref 4.1–5.7)
SODIUM SERPL-SCNC: 141 MMOL/L (ref 136–145)
URATE SERPL-MCNC: 8.9 MG/DL (ref 3.5–7.2)
WBC # BLD AUTO: 6.3 K/UL (ref 4.1–11.1)

## 2022-05-11 ENCOUNTER — TELEPHONE (OUTPATIENT)
Dept: CARDIOLOGY CLINIC | Age: 42
End: 2022-05-11

## 2022-05-11 DIAGNOSIS — I50.9 CONGESTIVE HEART FAILURE, UNSPECIFIED HF CHRONICITY, UNSPECIFIED HEART FAILURE TYPE (HCC): ICD-10-CM

## 2022-05-11 DIAGNOSIS — M10.00 IDIOPATHIC GOUT, UNSPECIFIED CHRONICITY, UNSPECIFIED SITE: ICD-10-CM

## 2022-05-11 RX ORDER — CHOLECALCIFEROL (VITAMIN D3) 125 MCG
5000 CAPSULE ORAL DAILY
Qty: 90 CAPSULE | Refills: 1 | Status: SHIPPED | OUTPATIENT
Start: 2022-05-11 | End: 2022-10-25 | Stop reason: DRUGHIGH

## 2022-05-11 NOTE — TELEPHONE ENCOUNTER
Labs reviewed, Cr up slightly  Vit D suboptimal  Uric acid elevated    Ensure pt taking allopurinol  Start Cholecalciferol 5000 units daily

## 2022-05-11 NOTE — TELEPHONE ENCOUNTER
Dexter Dang, NP  [facility] 2 hours ago (11:25 AM)     Rea Dangeloy    Can you please let pt know about lab results, new rx for vit D and ask about allopurinol? Thank you. Routing comment      1180 - called pt number on file but pt advised at last office visit that his phone was broke and requested we call his mother who's number is on file. No answer when attempted to call pt number. Will try mothers number. 65 - called pt mother d/t pt phone being broken per pt request at last office visit. Pt mother answered then hung up before I was able to speak. 1422 - attempted pt mothers again, she answered and hung up again. 1426 - attempted to call pt mother again, she answered, asked pt mother if pt phone was still broken or if there was a way I could speak to pt. She states she will let him know to call our office. Will await call from pt.     Josef Roy RN

## 2022-05-16 RX ORDER — ALLOPURINOL 100 MG/1
100 TABLET ORAL DAILY
Qty: 30 TABLET | Refills: 3 | Status: SHIPPED | OUTPATIENT
Start: 2022-05-16 | End: 2022-07-14

## 2022-05-16 NOTE — TELEPHONE ENCOUNTER
I called patient, reviewed all lab results, he states understanding. He states he has not been taking allopurinol because he ran out, refill sent. Requested Prescriptions     Signed Prescriptions Disp Refills    cholecalciferol (VITAMIN D3) (5000 Units /125 mcg) capsule 90 Capsule 1     Sig: Take 1 Capsule by mouth daily. Authorizing Provider: China Knutson allopurinoL (ZYLOPRIM) 100 mg tablet 30 Tablet 3     Sig: Take 1 Tablet by mouth daily.      Authorizing Provider: Ronald Oneal     Ordering User: Radha Trejo

## 2022-06-27 RX ORDER — PANTOPRAZOLE SODIUM 20 MG/1
TABLET, DELAYED RELEASE ORAL
Qty: 60 TABLET | Refills: 0 | Status: SHIPPED | OUTPATIENT
Start: 2022-06-27 | End: 2022-08-10

## 2022-07-18 ENCOUNTER — TELEPHONE (OUTPATIENT)
Dept: CARDIOLOGY CLINIC | Age: 42
End: 2022-07-18

## 2022-07-18 NOTE — TELEPHONE ENCOUNTER
Telephone Call RE:  Appointment reminder     Outcome:     [x] Patient confirmed appointment   [] Patient rescheduled appointment for    [] Unable to reach  [] Left message             [] Other:     ---------------------             [x] Screened for [address] [facility]

## 2022-07-20 ENCOUNTER — TELEPHONE (OUTPATIENT)
Dept: CARDIOLOGY CLINIC | Age: 42
End: 2022-07-20

## 2022-07-20 ENCOUNTER — VIRTUAL VISIT (OUTPATIENT)
Dept: CARDIOLOGY CLINIC | Age: 42
End: 2022-07-20
Payer: MEDICAID

## 2022-07-20 DIAGNOSIS — I42.8 NICM (NONISCHEMIC CARDIOMYOPATHY) (HCC): ICD-10-CM

## 2022-07-20 DIAGNOSIS — I50.22 CHRONIC SYSTOLIC CONGESTIVE HEART FAILURE (HCC): Primary | ICD-10-CM

## 2022-07-20 PROCEDURE — 99443 PR PHYS/QHP TELEPHONE EVALUATION 21-30 MIN: CPT | Performed by: NURSE PRACTITIONER

## 2022-07-20 RX ORDER — SILDENAFIL 25 MG/1
25 TABLET, FILM COATED ORAL
Qty: 4 TABLET | Refills: 0 | Status: SHIPPED | OUTPATIENT
Start: 2022-07-20 | End: 2022-10-19 | Stop reason: RX

## 2022-07-20 NOTE — PATIENT INSTRUCTIONS
Medication changes:    Sildenafil citrate (viagra) 25mg- take one tablet as needed daily for erectile dysfunction     Please take this to your pharmacy to notify them of the change in medications. Testing Ordered: Other Recommendations: We will fax order to [facility] please follow up with them by Friday to create an appt for an echo in the next couple of weeks [phone]    Please create a follow up with a PCP. Recommended in your area is [facility]   Phone: [phone]  Address: [address] [place], [address]       Ensure your drinking an adequate amount of water with a goal of 6-8 eight ounce glasses (1.5-2 liters) of fluid daily. Your urine should be clear and light yellow straw colored. If your blood pressure begins to consistently run below 90/60 and/or you begin to experience dizziness or lightheadedness, please contact the [facility] [address] [address] at [phone]. Follow up 3 months with echo with NP  with [facility]      Please monitor your weights daily upon waking and after using the bathroom. Keep a written records of your weights and bring to your next appointment. If you have a weight gain of 3 or more pounds overnight OR 5 or more pounds in one week please contact our office. Thank you for allowing us the privilege of being a part of your healthcare team! Please do not hesitate to contact our office at [phone] with any questions or concerns. Virtual Heart Failure [address] invites you to learn more about heart failure and to share your questions, ideas, and experiences with others. Each month, the Heart Failure Support Group features a new educational topic and a guest speaker, followed by an interactive discussion. Our Heart Failure Nurse Navigator will moderate each session. You will be able to participate by phone, tablet or computer through [address] [place] [address].  This support group takes place on the 3rd Thursday of each month from 6:00-7:30PM. All individuals living with heart failure and their caregivers are welcome to join. If you are interested in participating, please contact us at [email or web address] and you will be sent the link to join the [employer].

## 2022-07-20 NOTE — PROGRESS NOTES
[facility] in [address] Note    Patient name: Rio Cisneros  Patient : 1980  Patient MRN: [id number]  Date of service: 22      Rio Cisneros, who was evaluated through a patient-initiated, synchronous (real-time) audio only encounter, and/or her healthcare decision maker, is aware that it is a billable service, with coverage as determined by his insurance carrier. He provided verbal consent to proceed: Yes. He has not had a related appointment within my department in the past 7 days or scheduled within the next 24 hours. On this date 2022 I have spent 45 minutes reviewing previous notes, test results and face to face (virtual) with the patient discussing the diagnosis and importance of compliance with the treatment plan as well as documenting on the day of the visit. CHIEF COMPLAINT:    Chief Complaint   Patient presents with    CHF        PLAN OF CARE:   HFrEF d/t NICM, unable to tolerate optimal GDMT due to hypotension   Pt with slow recovery of LVEF and minimal HF symptoms; referral for LVAD/OHT not done previously d/t pt's active daily marijuana use, and concern for addiction disorder  Importance of abstinence from substance use was previously discussed with patient and mother; Jaylin Bartlett will need 6 months period substance free to be considered for cardiac replacement therapy and formal enrollment in counseling and treatment program if addiction disorder is identified.      Invitae positive for aTTR; PYP and cMRI negative  Currently well compensated on GDMT      RECOMMENDATIONS:  Beta-blocker: Continue coreg 3.125mg BID, titration limited d/t hypotension  ACEi/ARB/ARNi: continue Entresto half tablet of 24/26mg twice daily; no uptitration due to hypotension  Continue current dose of spironolactone 25mg daily  Continue lasix 40mg po daily  Continue allopurinol 100 mg PO daily, uric acid level 7.6  Was on chronic anticoagulation with warfarin for LV thrombus- was stopped by PCP per pt. Does not take ASA or Statin  Ok to trial PDE Inhibitor for ED- discussed with pt that he needs to see PCP for this, will send 4 tabs to trial response and will need further refills from PCP  Pt had initial visit with Sleep Medicine, was not able to do in-office PSG d/t anxiety; pt declines further evaluation or consideration of home sleep study   Labs next office visit  Echocardiogram due for repeat, will send order again to [facility]  Reinforced heart healthy, low salt diet  Reinforced appropriate fluid intake of 6 x 8oz glasses of water per day  Counseled on importance of THC wean; concern re: addiction disorder; pt has seen counselor; no UDS ordered, patient acknowledges current use   Document in diary BP/HR and daily weights  Referral for Primary Care in [place], [place]  Pt would benefit from establishing care with a general cardiologist closer to home     Return to [address] in 3 months in clinic with NP/MD         IMPRESSION:  Shortness of breath  Chronic systolic heart failure  Stage C, NYHA class I symptoms  Patient diagnosed with heart failure 3 years ago (per patient report, no records)  Reportedly non-ischemic cardiomyopathy, LVEF 10% at diagnosis, now improved to 34%  H/o chronic troponin elevation  No record of [facility]   Invitae + aTTR; PYP (01/21) neg; cMRI neg for amyloid  +Coxsackie and Parvovirus Ab  H/o LV thrombus 2.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 Biplane method used to measure ejection fraction.  Severely dilated left ventricle. Upper normal wall thickness. Moderate-to-severely reduced systolic function. Severe (grade 3) left ventricular diastolic dysfunction. LA: Moderately dilated left atrium. RV: Mildly reduced systolic function. MV: Mild mitral valve regurgitation is present. IAS: There was no shunting at baseline. PA: Mild pulmonary hypertension. Pulmonary arterial systolic pressure is 35 mmHg. Contrast used: DEFINITY. Echo (9/16/20) LVEF 10%, LVEDD 5.4cm, LV thrombus 2.7x 3cm, reduced RV function, mild PAH    EKG (9/19/20) sinus tachycardia 102bpm, QRS 95ms    [facility] no records    cMRI (9/7/21)  1. Mildly dilated left ventricle by 3-D volumetric assessment with moderate left  ventricular systolic dysfunction. Mild global hypokinesis with regional  variation. Moderate hypokinesis of the base to mid and distal anterior and  anteroseptal wall. Moderate hypokinesis of the mid to distal inferolateral wall. 3-D LVEF 34%. 2. Normal right ventricular size and systolic function. RVEF 60%. 3. No significant valvular disease. 4. On EGE and LGE study, there is patchy areas of enhancement present. There is  an area of mid wall septal stripe enhancement of the base to mid septal wall. There is a focal area of mid myocardial enhancement of the mid inferior wall. There is a focal area of subepicardial enhancement of the base to mid  inferolateral wall. All these findings suggest possibility of an old healed  myocarditis. These features are not consistent with infiltrative cardiac  sarcoidosis or cardiac amyloidosis. There is no features of recent or old  myocardial infarction. 5. Normal pleura and pericardium. There is no significant effusions.     PYP (1/21/21): PYP scan is not suggestive for ATTR cardiac amyloidosis based on the H:CL ratio of 1.0 and semiquantitative score of 1.      6MW   6 Min Walk Report 4/26/2022 9/14/2021 3/17/2021 2/17/2021   (PRE) HR 74 81 88 70   (PRE) O2 Sat 98 98 98 98   (POST) HR 93 79 94 108 (POST) O2 Sat 96 97 85 96   Distance in Meters 377.34 400.66 362.1 369.75         HISTORY OF PRESENT ILLNESS:  I had the pleasure of seeing Marlo Hassan in [facility] at [address] in [place]. Briefly, Marlo Hassan is a 36 y.o. male with h/o tobacco (quit 2015), alcohol (quit 9/2020) and THC use (daily) and strong family history of several members with early cardiomyopathy and cardiac deaths. Patient presented with chronic systolic heart failure, stage C, NYHA class IIIA symptoms, states was diagnosed with heart failure 3 years ago (per patient report, no records). He was off medications for several months. Patient was then admitted with acute heart failure, reportedly non-ischemic cardiomyopathy, LVEF 10% (no record of [facility]), LV thrombus 2.7 x 3cm (by echo 9/2020) c/b splenic and renal infarcts started on chronic anticoagulation with coumadin. Discharged home with lifevest and [facility]     Patient lives 2.5 hours from [place], he lives closer to [place] but for the next couple of months would like to follow with us. If needs heart transplant [facility] would be the closer site. INTERVAL HISTORY:  Today, patient presents for routine clinic visit. Recently his BP has been up, but he is under stress due to a recent unexpected death in the family. He otherwise reports doing well. He remains active and feels like he can walk around and do activity as he wants without limitation. He walks about a half mile daily to his grandparents house. Patient denies symptoms of volume overload or leg edema. Reports a good appetite. he denies abdominal bloating, nausea or early satiety. Patient's weight remained stable. he denies orthopnea, PND or nocturia. Denies irregular heart rate or palpitations. No presyncope or syncope.  LifeVest previously, but pt returned it due to discomfort of wearing it.      he is compliant with fluid restriction and taking medications as prescribed. Patient manages his own medications. REVIEW OF SYSTEMS:  Review of Systems   Constitutional:  Negative for chills, fever and malaise/fatigue. Respiratory:  Negative for cough and shortness of breath. Cardiovascular:  Negative for chest pain, palpitations, orthopnea and leg swelling. Gastrointestinal:  Negative for abdominal pain, constipation, diarrhea, nausea and vomiting. Neurological:  Negative for dizziness, focal weakness and weakness. PHYSICAL EXAM:  Patient-Reported Vitals 7/20/2022   Patient-Reported Weight 190lb   Patient-Reported Pulse 79   Patient-Reported Temperature patient unable to obtain   Patient-Reported SpO2 patient unable to obtain   Patient-Reported Systolic  038   Patient-Reported Diastolic 618          Physical Exam  Constitutional:       General: He is not in acute distress. Appearance: Normal appearance. HENT:      Head: Normocephalic and atraumatic. Neck:      Vascular: No hepatojugular reflux or JVD. Cardiovascular:      Rate and Rhythm: Normal rate and regular rhythm. Pulses: Normal pulses. Heart sounds: Normal heart sounds. No murmur heard. No friction rub. No gallop. Pulmonary:      Effort: Pulmonary effort is normal. No respiratory distress. Breath sounds: Normal breath sounds. Abdominal:      General: Bowel sounds are normal. There is distension. Palpations: Abdomen is soft. Tenderness: There is no abdominal tenderness. Musculoskeletal:      Right lower leg: No edema. Left lower leg: No edema. Skin:     General: Skin is warm and dry. Capillary Refill: Capillary refill takes less than 2 seconds. Neurological:      General: No focal deficit present. Mental Status: He is alert and oriented to person, place, and time. Psychiatric:         Mood and Affect: Mood normal.         Behavior: Behavior normal.         Thought Content:  Thought content normal.         Judgment: Judgment normal.          PAST MEDICAL HISTORY:  No past medical history on file. PAST SURGICAL HISTORY:  No past surgical history on file. FAMILY HISTORY:  No family history on file. SOCIAL HISTORY:  Social History     Socioeconomic History    Marital status: SINGLE   Tobacco Use    Smoking status: Some Days     Types: Cigarettes     Last attempt to quit: 1/1/2012     Years since quitting: 10.5    Smokeless tobacco: Never    Tobacco comments:     1 cigarette q4 days per pt   Substance and Sexual Activity    Alcohol use: Yes    Drug use: Yes     Types: Marijuana       LABORATORY RESULTS:  No flowsheet data found. ALLERGY:  No Known Allergies     CURRENT MEDICATIONS:    Current Outpatient Medications:     allopurinoL (ZYLOPRIM) 100 mg tablet, TAKE 1 TABLET BY MOUTH DAILY. , Disp: 30 Tablet, Rfl: 2    pantoprazole (PROTONIX) 20 mg tablet, TAKE 2 TABLETS BY MOUTH ONCE DAILY, Disp: 60 Tablet, Rfl: 0    cholecalciferol (VITAMIN D3) (5000 Units /125 mcg) capsule, Take 1 Capsule by mouth daily. , Disp: 90 Capsule, Rfl: 1    furosemide (LASIX) 40 mg tablet, Take 1 Tablet by mouth two (2) times a day., Disp: 60 Tablet, Rfl: 2    magnesium oxide (MAG-OX) 400 mg tablet, TAKE 1 TABLET BY MOUTH ONCE A DAY, Disp: 30 Tablet, Rfl: 1    carvediloL (COREG) 3.125 mg tablet, TAKE 1 TABLET BY MOUTH TWICE A DAY, Disp: 60 Tablet, Rfl: 2    Entresto 24-26 mg tablet, TAKE 1/2 TABLET BY MOUTH TWICE DAILY, Disp: 60 Tablet, Rfl: 2    potassium chloride (K-DUR, KLOR-CON) 20 mEq tablet, Take 0.5 Tablets by mouth daily. May take an additional 20 mEq daily PRN as directed by [facility]., Disp: 30 Tablet, Rfl: 2    spironolactone (ALDACTONE) 25 mg tablet, Take 0.5 Tablets by mouth daily. (Patient not taking: Reported on 7/20/2022), Disp: 15 Tablet, Rfl: 2    warfarin (COUMADIN) 7.5 mg tablet, Take 7.5 mg by mouth daily.  Managed by Dr. Álvaro Gustafson not been in over a month  Pt states he has not seen Dr. Ortiz Penaloza in a month (Patient not taking: No sig reported), Disp: , Rfl:         Marilyn Oconnell.  Robert Castillo, MSN, AGACNP-BC  [address] [place] [address], [address]  Phone: [phone]  Fax: [phone]    PATIENT CARE TEAM:  Patient Care Team:  None as PCP - Ramiro Chadwick MD (Cardiovascular Disease Physician)

## 2022-07-20 NOTE — TELEPHONE ENCOUNTER
Patient called in to reschedule his follow up appt. Appt was supposed to be virtual.    Reached out to KAMRAN Porter to reschedule appt for later today, She said she can do 2:30pm.    Patient agreed for virtual appt today 07/20 @ 2:30pm

## 2022-07-20 NOTE — TELEPHONE ENCOUNTER
Patient called. Used two patient identifiers. Patient asking if sildenafil had been sent to pharmacy from appt. Notified patient that it has. Advised patient on after visit summary per Rita Angela np. Educated patient for further refills of sildenafil patient will need to have PCP refill. Provided patient with number to contact a PCP for scheduling at [facility]. Also, notified patient that I will follow up with [facility] about scheduling for echo and will fax order. Advised patient to reach out on Friday to schedule if someone has not yet contacted him. Patient stated understanding of instructions and had no further questions. Transferred patient to [facility] to create 3 month follow up appt. Steph Cole RN     Faxed echo order requesting that facility contact patient for scheduling within one month.  Steph Cole RN

## 2022-08-01 ENCOUNTER — TELEPHONE (OUTPATIENT)
Dept: CARDIOLOGY CLINIC | Age: 42
End: 2022-08-01

## 2022-08-01 NOTE — TELEPHONE ENCOUNTER
Tani left message stating that he had a question regarding scheduling for echo. Called patient with no answer and unable to leave a message. Will try again later. Clay DAWN     Called patient using two patient identifiers. Notified patient that echo paperwork has been faxed to requested facility with fax confirmation. Patient states he will call to schedule echo. He states he will call back if having any issues scheduling.  Neptali Grubbs RN

## 2022-08-10 RX ORDER — PANTOPRAZOLE SODIUM 20 MG/1
TABLET, DELAYED RELEASE ORAL
Qty: 60 TABLET | Refills: 0 | Status: SHIPPED | OUTPATIENT
Start: 2022-08-10 | End: 2022-09-26

## 2022-08-15 ENCOUNTER — TELEPHONE (OUTPATIENT)
Dept: CARDIOLOGY CLINIC | Age: 42
End: 2022-08-15

## 2022-08-15 DIAGNOSIS — I50.9 CONGESTIVE HEART FAILURE, UNSPECIFIED HF CHRONICITY, UNSPECIFIED HEART FAILURE TYPE (HCC): Primary | ICD-10-CM

## 2022-08-15 NOTE — TELEPHONE ENCOUNTER
Patient called in stating that facility still hasn't received order to get ECHO done.  He provided the fax number #[phone]

## 2022-08-15 NOTE — TELEPHONE ENCOUNTER
Faxed echo order again to requested fax number at [facility] with fax confirmation. Elaine Jaramillo RN      Called patient using two patient identifiers. Notified patient that I refaxed echo order to requested fax number for [facility]. He states he will call back to [facility] tomorrow to confirm and schedule echo.  Elaine Jaramillo RN

## 2022-09-26 RX ORDER — PANTOPRAZOLE SODIUM 20 MG/1
TABLET, DELAYED RELEASE ORAL
Qty: 60 TABLET | Refills: 0 | Status: SHIPPED | OUTPATIENT
Start: 2022-09-26 | End: 2022-10-26

## 2022-10-19 ENCOUNTER — OFFICE VISIT (OUTPATIENT)
Dept: CARDIOLOGY CLINIC | Age: 42
End: 2022-10-19
Payer: MEDICAID

## 2022-10-19 VITALS
OXYGEN SATURATION: 98 % | RESPIRATION RATE: 16 BRPM | WEIGHT: 196.8 LBS | SYSTOLIC BLOOD PRESSURE: 160 MMHG | HEIGHT: 69 IN | BODY MASS INDEX: 29.15 KG/M2 | HEART RATE: 65 BPM | TEMPERATURE: 97.7 F | DIASTOLIC BLOOD PRESSURE: 108 MMHG

## 2022-10-19 DIAGNOSIS — M10.9 GOUT, UNSPECIFIED CAUSE, UNSPECIFIED CHRONICITY, UNSPECIFIED SITE: ICD-10-CM

## 2022-10-19 DIAGNOSIS — R06.02 SHORTNESS OF BREATH: ICD-10-CM

## 2022-10-19 DIAGNOSIS — R53.83 FATIGUE, UNSPECIFIED TYPE: ICD-10-CM

## 2022-10-19 DIAGNOSIS — I50.22 CHRONIC SYSTOLIC HEART FAILURE (HCC): Primary | ICD-10-CM

## 2022-10-19 DIAGNOSIS — I50.9 CONGESTIVE HEART FAILURE, UNSPECIFIED HF CHRONICITY, UNSPECIFIED HEART FAILURE TYPE (HCC): ICD-10-CM

## 2022-10-19 DIAGNOSIS — D50.8 OTHER IRON DEFICIENCY ANEMIA: ICD-10-CM

## 2022-10-19 DIAGNOSIS — E55.9 VITAMIN D DEFICIENCY: ICD-10-CM

## 2022-10-19 PROCEDURE — 99000 SPECIMEN HANDLING OFFICE-LAB: CPT | Performed by: NURSE PRACTITIONER

## 2022-10-19 PROCEDURE — 94618 PULMONARY STRESS TESTING: CPT | Performed by: NURSE PRACTITIONER

## 2022-10-19 PROCEDURE — 99214 OFFICE O/P EST MOD 30 MIN: CPT | Performed by: NURSE PRACTITIONER

## 2022-10-19 RX ORDER — POTASSIUM CHLORIDE 20 MEQ/1
10 TABLET, EXTENDED RELEASE ORAL DAILY
Qty: 30 TABLET | Refills: 2 | Status: SHIPPED | OUTPATIENT
Start: 2022-10-19

## 2022-10-19 RX ORDER — CARVEDILOL 3.12 MG/1
3.12 TABLET ORAL 2 TIMES DAILY
Qty: 60 TABLET | Refills: 2 | Status: SHIPPED | OUTPATIENT
Start: 2022-10-19

## 2022-10-19 RX ORDER — SACUBITRIL AND VALSARTAN 24; 26 MG/1; MG/1
TABLET, FILM COATED ORAL
Qty: 60 TABLET | Refills: 2 | Status: SHIPPED | OUTPATIENT
Start: 2022-10-19 | End: 2022-10-25

## 2022-10-19 RX ORDER — FUROSEMIDE 40 MG/1
40 TABLET ORAL DAILY
Qty: 30 TABLET | Refills: 3 | Status: SHIPPED | OUTPATIENT
Start: 2022-10-19

## 2022-10-19 RX ORDER — SPIRONOLACTONE 25 MG/1
12.5 TABLET ORAL DAILY
Qty: 15 TABLET | Refills: 2 | Status: SHIPPED | OUTPATIENT
Start: 2022-10-19

## 2022-10-19 NOTE — TELEPHONE ENCOUNTER
Requested Prescriptions     Signed Prescriptions Disp Refills    furosemide (LASIX) 40 mg tablet 30 Tablet 3     Sig: Take 1 Tablet by mouth daily. Authorizing Provider: Lenore Grajeda     Ordering User: Milena Hopper     Patient called stating he does not have some of his medication. I called pharmacy, they state he did not fill his entresto from may to September but then filled entresto on 9/26/22. Patient states he does not have entresto. Adrian Iqbal, pharmacist, states he will give a partial fill and states to have patient call him to arrange. Patient states understanding. All other medications accounted for.

## 2022-10-19 NOTE — PATIENT INSTRUCTIONS
Medication changes:    Refills sent for your medications, including entresto and spironolactone-please make sure you are taking these. Please take this to your pharmacy to notify them of the change in medications. Testing Ordered: An order for an echocardiogram has been placed to be done in one month. You are scheduled for 11/17/22 at 2:30pm, arrival at 2:00. Located at [facility], [address]. PYP testing has been scheduled for November 17 at 8:30am. Please arrive by 8:00 to check in. This test is located at the [facility] [place] at [address], [address], [place], [address] [place] [address] Avenue. Please be aware that the entire process takes approximately 4 hours, as you will be injected with a radioisotope and then asked to wait for 3 hours after which you will be imaged for the scan. You can leave the facility and return in 2.5 hours to be imaged at the 3 hour keith. If this appointment does not work for you, please contact the [facility] [place] at [phone] to reschedule. Other Recommendations:     Call in one week with your weights, BP and HR readings     Ensure your drinking an adequate amount of water with a goal of 6-8 eight ounce glasses (1.5-2 liters) of fluid daily. Your urine should be clear and light yellow straw colored. If your blood pressure begins to consistently run below 90/60 and/or you begin to experience dizziness or lightheadedness, please contact the [facility] at [phone]. Please take your weight first thing in the morning, take your BP prior to taking medication and 2 hours later. Follow up in 4 weeks with NP with [facility]      Please monitor your weights daily upon waking and after using the bathroom. Keep a written records of your weights and bring to your next appointment.  If you have a weight gain of 3 or more pounds overnight OR 5 or more pounds in one week please contact our office. Thank you for allowing us the privilege of being a part of your healthcare team! Please do not hesitate to contact our office at [phone] with any questions or concerns. Virtual Heart Failure [address] invites you to learn more about heart failure and to share your questions, ideas, and experiences with others. Each month, the Heart Failure Support Group features a new educational topic and a guest speaker, followed by an interactive discussion. Our Heart Failure Nurse Navigator will moderate each session. You will be able to participate by phone, tablet or computer through [employer]. This support group takes place on the 3rd Thursday of each month from 6:00-7:30PM. All individuals living with heart failure and their caregivers are welcome to join. If you are interested in participating, please contact us at [email or web address] and you will be sent the link to join the [employer].

## 2022-10-19 NOTE — PROGRESS NOTES
[facility] in [place], [facility] Note    Patient name: Loretta Fonseca  Patient : 1980  Patient MRN: [id number]  Date of service: 10/19/22      CHIEF COMPLAINT:    Chief Complaint   Patient presents with    CHF     Follow up-no complaints        PLAN OF CARE:   HFrEF d/t NICM, unable to tolerate optimal GDMT due to hypotension   Pt with slow recovery of LVEF and minimal HF symptoms; referral for LVAD/OHT not done previously d/t pt's active daily marijuana use, and concern for addiction disorder  Importance of abstinence from substance use was previously discussed with patient and mother; Giovani Martines will need 6 months period substance free to be considered for cardiac replacement therapy and formal enrollment in counseling and treatment program if addiction disorder is identified. Invitae positive for aTTR; PYP and cMRI negative      RECOMMENDATIONS:  Beta-blocker: Continue coreg 3.125mg BID, titration limited d/t hypotension  ACEi/ARB/ARNi: Resume Entresto half tablet of 24/26mg twice daily  Resume spironolactone 25mg daily  Send vyndamax prescription, unlikely will get coverage due to negative PYP  Continue lasix 40mg po daily   Continue allopurinol 100 mg PO daily, uric acid level 7.6  Was on chronic anticoagulation with warfarin for LV thrombus- was stopped by PCP per pt.      Does not take ASA or Statin  Pt had initial visit with Sleep Medicine, was not able to do in-office PSG d/t anxiety; pt declines further evaluation or consideration of home sleep study   HF labs today  6 MW in clinic today   Needs TTE and repeat PYP  Reinforced heart healthy, low salt diet  Reinforced appropriate fluid intake of 6 x 8oz glasses of water per day  Counseled on importance of THC wean; concern re: addiction disorder; pt has seen counselor; no UDS ordered, patient acknowledges current use   Document in diary BP/HR and daily weights  Referral for Primary Care in [place], [place]- needs to make appt   Pt would benefit from establishing care with a general cardiologist closer to home   Will send refererral to [facility] amyloid clinic for positive ATTR but negative PYP for consideration of prophylactic therapy    Follow-up and Dispositions    Return in about 4 weeks (around 11/16/2022). IMPRESSION:  Shortness of breath  Chronic systolic heart failure  Stage C, NYHA class I symptoms  Patient diagnosed with heart failure 3 years ago (per patient report, no records)  Reportedly non-ischemic cardiomyopathy, LVEF 10% at diagnosis, now improved to 34%  H/o chronic troponin elevation  No record of 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 Biplane method used to measure ejection fraction. Severely dilated left ventricle. Upper normal wall thickness. Moderate-to-severely reduced systolic function. Severe (grade 3) left ventricular diastolic dysfunction. LA: Moderately dilated left atrium. RV: Mildly reduced systolic function. MV: Mild mitral valve regurgitation is present. IAS: There was no shunting at baseline. PA: Mild pulmonary hypertension. Pulmonary arterial systolic pressure is 35 mmHg. Contrast used: DEFINITY. Echo (9/16/20) LVEF 10%, LVEDD 5.4cm, LV thrombus 2.7x 3cm, reduced RV function, mild PAH    EKG (9/19/20) sinus tachycardia 102bpm, QRS 95ms    [facility] no records    cMRI (9/7/21)  1.  Mildly dilated left ventricle by 3-D volumetric assessment with moderate left  ventricular systolic dysfunction. Mild global hypokinesis with regional  variation. Moderate hypokinesis of the base to mid and distal anterior and  anteroseptal wall. Moderate hypokinesis of the mid to distal inferolateral wall. 3-D LVEF 34%. 2. Normal right ventricular size and systolic function. RVEF 60%. 3. No significant valvular disease. 4. On EGE and LGE study, there is patchy areas of enhancement present. There is  an area of mid wall septal stripe enhancement of the base to mid septal wall. There is a focal area of mid myocardial enhancement of the mid inferior wall. There is a focal area of subepicardial enhancement of the base to mid  inferolateral wall. All these findings suggest possibility of an old healed  myocarditis. These features are not consistent with infiltrative cardiac  sarcoidosis or cardiac amyloidosis. There is no features of recent or old  myocardial infarction. 5. Normal pleura and pericardium. There is no significant effusions. PYP (1/21/21): PYP scan is not suggestive for ATTR cardiac amyloidosis based on the H:CL ratio of 1.0 and semiquantitative score of 1.      6MW   6 Min Walk Report 4/26/2022 9/14/2021 3/17/2021 2/17/2021   (PRE) HR 74 81 88 70   (PRE) O2 Sat 98 98 98 98   (POST) HR 93 79 94 108   (POST) O2 Sat 96 97 85 96   Distance in Meters 377.34 400.66 362.1 369.75         HISTORY OF PRESENT ILLNESS:  I had the pleasure of seeing Gerald Power in [address] [place] [address] at [facility] in [place]. Briefly, Gerald Power is a 36 y.o. male with h/o tobacco (quit 2015), alcohol (quit 9/2020) and THC use (daily) and strong family history of several members with early cardiomyopathy and cardiac deaths. Patient presented with chronic systolic heart failure, stage C, NYHA class IIIA symptoms, states was diagnosed with heart failure 3 years ago (per patient report, no records). He was off medications for several months. Patient was then admitted with acute heart failure, reportedly non-ischemic cardiomyopathy, LVEF 10% (no record of [facility]), LV thrombus 2.7 x 3cm (by echo 9/2020) c/b splenic and renal infarcts started on chronic anticoagulation with coumadin. Discharged home with lifevest and referred to [facility]     Patient lives 2.5 hours from [facility], he lives closer to [place] but for the next couple of months would like to follow with us. If needs heart transplant [facility] would be the closer site. INTERVAL HISTORY:  Today, patient presents for HF clinic visit. He states he feels well but he has been out of his Efren Landsberg and Spironolactone since at least April due to losing his pill bottles and not realizing what he was missing. He can do his normal activities but admits he mostly walks to his grandmothers house and sits in front of the TV. His weight is up but he does not appear to be volume overloaded. He had decreased the amount of marijuana he uses in general.     REVIEW OF SYSTEMS:  Constitutional: Negative for chills and fever. Respiratory: Negative for cough and shortness of breath. Cardiovascular: Negative for chest pain, palpitations, orthopnea and leg swelling. Gastrointestinal: Negative for abdominal pain, constipation, diarrhea, nausea and vomiting. Neurological: Negative for dizziness, focal weakness and weakness. PHYSICAL EXAM:  Visit Vitals  BP (!) 160/108 (BP 1 Location: Right upper arm, BP Patient Position: Sitting)   Pulse 65   Temp 97.7 °F (36.5 °C) (Oral)   Resp 16   Ht 5' 9\" (1.753 m)   Wt 196 lb 12.8 oz (89.3 kg)   SpO2 98%   BMI 29.06 kg/m²         Physical Exam  Constitutional:       General: He is not in acute distress. Appearance: Normal appearance. HENT:      Head: Normocephalic and atraumatic. Neck:      Vascular: No hepatojugular reflux or JVD. Cardiovascular:      Rate and Rhythm: Normal rate and regular rhythm. Pulses: Normal pulses.       Heart sounds: Normal heart sounds. No murmur heard. No friction rub. No gallop. Pulmonary:      Effort: Pulmonary effort is normal. No respiratory distress. Breath sounds: Normal breath sounds. Abdominal:      General: Bowel sounds are normal.     Palpations: Abdomen is soft. Tenderness: There is no abdominal tenderness. Musculoskeletal:      Right lower leg: No edema. Left lower leg: No edema. Skin:     General: Skin is warm and dry. Capillary Refill: Capillary refill takes less than 2 seconds. Neurological:      General: No focal deficit present. Mental Status: He is alert and oriented to person, place, and time. Psychiatric:         Mood and Affect: Mood normal.         Behavior: Behavior normal.         Thought Content: Thought content normal.         Judgment: Judgment normal.          PAST MEDICAL HISTORY:  No past medical history on file. PAST SURGICAL HISTORY:  No past surgical history on file. FAMILY HISTORY:  No family history on file. SOCIAL HISTORY:  Social History     Socioeconomic History    Marital status: SINGLE   Tobacco Use    Smoking status: Former     Types: Cigarettes     Quit date: 1/1/2012     Years since quitting: 10.8    Smokeless tobacco: Never    Tobacco comments:     1 cigarette q4 days per pt   Substance and Sexual Activity    Alcohol use: Yes     Alcohol/week: 1.0 standard drink     Types: 1 Cans of beer per week     Comment: 1 beer per day    Drug use: Yes     Types: Marijuana     Comment: moderate use of marijuana per patient       LABORATORY RESULTS:  No flowsheet data found. ALLERGY:  No Known Allergies     CURRENT MEDICATIONS:    Current Outpatient Medications:     carvediloL (COREG) 3.125 mg tablet, Take 1 Tablet by mouth two (2) times a day., Disp: 60 Tablet, Rfl: 2    potassium chloride (K-DUR, KLOR-CON M20) 20 mEq tablet, Take 0.5 Tablets by mouth daily.  May take an additional 20 mEq daily PRN as directed by [facility]., Disp: 30 Tablet, Rfl: 2    spironolactone (ALDACTONE) 25 mg tablet, Take 0.5 Tablets by mouth daily. , Disp: 15 Tablet, Rfl: 2    sacubitriL-valsartan (Entresto) 24-26 mg tablet, TAKE 1/2 TABLET BY MOUTH TWICE DAILY, Disp: 60 Tablet, Rfl: 2    pantoprazole (PROTONIX) 20 mg tablet, TAKE 2 TABLETS BY MOUTH ONCE DAILY, Disp: 60 Tablet, Rfl: 0    magnesium oxide (MAG-OX) 400 mg tablet, TAKE 1 TABLET BY MOUTH ONCE A DAY, Disp: 30 Tablet, Rfl: 1    allopurinoL (ZYLOPRIM) 100 mg tablet, TAKE 1 TABLET BY MOUTH DAILY. , Disp: 30 Tablet, Rfl: 2    cholecalciferol (VITAMIN D3) (5000 Units /125 mcg) capsule, Take 1 Capsule by mouth daily. , Disp: 90 Capsule, Rfl: 1    furosemide (LASIX) 40 mg tablet, Take 1 Tablet by mouth two (2) times a day.  (Patient taking differently: Take 40 mg by mouth daily.), Disp: 60 Tablet, Rfl: 2        oLuis Massey, KAMILLA  [address] [place] [address], [address] [facility]  Phone: [phone]      PATIENT CARE TEAM:  Patient Care Team:  None as PCP - Mathieu Fontenot MD (Cardiovascular Disease Physician)

## 2022-10-20 LAB
25(OH)D3 SERPL-MCNC: 18.6 NG/ML (ref 30–100)
ALBUMIN SERPL-MCNC: 4 G/DL (ref 3.5–5)
ALBUMIN/GLOB SERPL: 1 {RATIO} (ref 1.1–2.2)
ALP SERPL-CCNC: 163 U/L (ref 45–117)
ALT SERPL-CCNC: 52 U/L (ref 12–78)
ANION GAP SERPL CALC-SCNC: 7 MMOL/L (ref 5–15)
AST SERPL-CCNC: 46 U/L (ref 15–37)
BILIRUB SERPL-MCNC: 0.6 MG/DL (ref 0.2–1)
BNP SERPL-MCNC: 779 PG/ML
BUN SERPL-MCNC: 14 MG/DL (ref 6–20)
BUN/CREAT SERPL: 11 (ref 12–20)
CALCIUM SERPL-MCNC: 9.5 MG/DL (ref 8.5–10.1)
CHLORIDE SERPL-SCNC: 106 MMOL/L (ref 97–108)
CO2 SERPL-SCNC: 24 MMOL/L (ref 21–32)
CREAT SERPL-MCNC: 1.32 MG/DL (ref 0.7–1.3)
ERYTHROCYTE [DISTWIDTH] IN BLOOD BY AUTOMATED COUNT: 15.1 % (ref 11.5–14.5)
FERRITIN SERPL-MCNC: 126 NG/ML (ref 26–388)
GLOBULIN SER CALC-MCNC: 3.9 G/DL (ref 2–4)
GLUCOSE SERPL-MCNC: 148 MG/DL (ref 65–100)
HCT VFR BLD AUTO: 45.5 % (ref 36.6–50.3)
HGB BLD-MCNC: 14.3 G/DL (ref 12.1–17)
IRON SATN MFR SERPL: 55 % (ref 20–50)
IRON SERPL-MCNC: 160 UG/DL (ref 35–150)
MAGNESIUM SERPL-MCNC: 2 MG/DL (ref 1.6–2.4)
MCH RBC QN AUTO: 30.2 PG (ref 26–34)
MCHC RBC AUTO-ENTMCNC: 31.4 G/DL (ref 30–36.5)
MCV RBC AUTO: 96 FL (ref 80–99)
NRBC # BLD: 0 K/UL (ref 0–0.01)
NRBC BLD-RTO: 0 PER 100 WBC
PLATELET # BLD AUTO: 188 K/UL (ref 150–400)
PMV BLD AUTO: 12.7 FL (ref 8.9–12.9)
POTASSIUM SERPL-SCNC: 4.2 MMOL/L (ref 3.5–5.1)
PROT SERPL-MCNC: 7.9 G/DL (ref 6.4–8.2)
RBC # BLD AUTO: 4.74 M/UL (ref 4.1–5.7)
SODIUM SERPL-SCNC: 137 MMOL/L (ref 136–145)
T4 FREE SERPL-MCNC: 0.9 NG/DL (ref 0.8–1.5)
TIBC SERPL-MCNC: 293 UG/DL (ref 250–450)
TSH SERPL DL<=0.05 MIU/L-ACNC: 1.31 UIU/ML (ref 0.36–3.74)
URATE SERPL-MCNC: 6 MG/DL (ref 3.5–7.2)
WBC # BLD AUTO: 6.7 K/UL (ref 4.1–11.1)

## 2022-10-20 NOTE — PROGRESS NOTES
Please call Crow Wilson to discuss their abnormal lab results. Please have him start Vit D 2000units daily.  Check level in 3 months

## 2022-10-24 ENCOUNTER — TELEPHONE (OUTPATIENT)
Dept: CARDIOLOGY CLINIC | Age: 42
End: 2022-10-24

## 2022-10-24 DIAGNOSIS — I50.9 CONGESTIVE HEART FAILURE, UNSPECIFIED HF CHRONICITY, UNSPECIFIED HEART FAILURE TYPE (HCC): Primary | ICD-10-CM

## 2022-10-24 DIAGNOSIS — M10.9 GOUT, UNSPECIFIED CAUSE, UNSPECIFIED CHRONICITY, UNSPECIFIED SITE: ICD-10-CM

## 2022-10-24 NOTE — TELEPHONE ENCOUNTER
----- Message from Beth Horta NP sent at 10/20/2022  1:16 PM EDT -----  Please call Jason Toscano to discuss their abnormal lab results. Please have him start Vit D 2000units daily. Check level in 3 months    I called patient , advised him of above, he states understanding. Patient had order for 5000 units daily. I called pharmacy and they state he had not filled that since February of 2021. I confirmed dosing with Parmjit Lozano and sent new prescription. Patient is requesting refill of Viagra, as he currently does not have a PCP-please advise. Referral faxed to [facility] for Dr. Jorge Hubbard for Cardiogenetics-patient notified. Patient notified regarding Vyndamax. Prior Authorization initiated. Patient lives 2-3 hours away, he states he will fill out patient assistance when he comes for next appt on 11/17/22. Patient gave VS and weights since re-starting entresto and spironolactone:   10/20: weight: 195 lb, BP am 169/115, HR 74, 2 hrs later: 132/92, HR 94  10/21: weight: 195 lb, BP am 166/108, HR 97, 2 hrs later: 123/91, HR 98  10/22: weight: 195 lb, BP am 156/100, HR 90, 2 hrs later: 133/90,   10/23: weight: 195 lb, BP am 150/98, HR 84, 2 hrs later: 127/84, HR 90  10/24: weight: 195 lb, BP am 154/107, HR 82, 2 hrs later 143/106, HR 81  Please advise, thank you    I called patient and advised him per AKBAR Sims:  Go up to 1 tab of entresto BID. Cont to monitor BP and weights. No to the viagra while we are uptitrating his entresto. Patient states understanding, prescription updated.

## 2022-10-24 NOTE — TELEPHONE ENCOUNTER
Telephone call to the patient to inquire reason for call. Patient stated he wants to provide the nurse with his blood pressure readings and weight while taking medication. Patient requesting call back.

## 2022-10-25 RX ORDER — ACETAMINOPHEN 500 MG
2000 TABLET ORAL DAILY
Qty: 30 CAPSULE | Refills: 3 | Status: SHIPPED | OUTPATIENT
Start: 2022-10-25

## 2022-10-25 RX ORDER — SACUBITRIL AND VALSARTAN 24; 26 MG/1; MG/1
1 TABLET, FILM COATED ORAL 2 TIMES DAILY
Qty: 60 TABLET | Refills: 2 | Status: SHIPPED | OUTPATIENT
Start: 2022-10-25 | End: 2022-11-17 | Stop reason: SDUPTHER

## 2022-10-26 RX ORDER — PANTOPRAZOLE SODIUM 20 MG/1
TABLET, DELAYED RELEASE ORAL
Qty: 60 TABLET | Refills: 0 | Status: SHIPPED | OUTPATIENT
Start: 2022-10-26

## 2022-11-03 ENCOUNTER — TELEPHONE (OUTPATIENT)
Dept: CARDIOLOGY CLINIC | Age: 42
End: 2022-11-03

## 2022-11-04 ENCOUNTER — TELEPHONE (OUTPATIENT)
Dept: CARDIOLOGY CLINIC | Age: 42
End: 2022-11-04

## 2022-11-04 NOTE — TELEPHONE ENCOUNTER
Called patient for weights, BP, HR, no answer. Voicemail box full, unable to leave message. Will try again later. 11/7/22- Called patient reviewed log of BP and heart rates: see below    10/29 before meds 155/90 HR 92, after meds 129/80 HR 73  10/30- before meds 160/94 HR 84 , after meds 125/70 HR 87  10/31-before meds 150/89 HR 84 , after meds 125/75 HR 87  11/1- Before meds 150/82 HR 87, after meds 120/70 HR 87  11/2- before meds 154/90 HR 90 , after meds 127/80 HR 73  11/3 - before meds  155/89 HR 90 , after meds 130/81 HR 83   11/4- before meds 153/90 HR 90, after meds 127/80 HR 75    Patient has not been recording weights in log, states weight has been the same, no change at 195lb. RN re-educated patient regarding importance of weighing everyday and recording to watch for trends. Patient denies any symptoms of SOB, swelling, dizziness, and chest pain.

## 2022-11-08 RX ORDER — CARVEDILOL 6.25 MG/1
6.25 TABLET ORAL 2 TIMES DAILY WITH MEALS
Qty: 60 TABLET | Refills: 1 | Status: SHIPPED | OUTPATIENT
Start: 2022-11-08

## 2022-11-08 NOTE — TELEPHONE ENCOUNTER
I called patient and advised him per Dr. Rosemary Marshall:  Increase coreg to 6.25mg twice daily     Patient states understanding, new prescription sent. He will call in one week with log of BP and HR readings.

## 2022-11-15 ENCOUNTER — TELEPHONE (OUTPATIENT)
Dept: CARDIOLOGY CLINIC | Age: 42
End: 2022-11-15

## 2022-11-15 DIAGNOSIS — I50.9 CONGESTIVE HEART FAILURE, UNSPECIFIED HF CHRONICITY, UNSPECIFIED HEART FAILURE TYPE (HCC): ICD-10-CM

## 2022-11-15 DIAGNOSIS — M10.00 IDIOPATHIC GOUT, UNSPECIFIED CHRONICITY, UNSPECIFIED SITE: ICD-10-CM

## 2022-11-15 RX ORDER — ALLOPURINOL 100 MG/1
100 TABLET ORAL DAILY
Qty: 30 TABLET | Refills: 2 | Status: SHIPPED | OUTPATIENT
Start: 2022-11-15

## 2022-11-15 NOTE — TELEPHONE ENCOUNTER
Called patient to review BP/HR log. Patient was at grocery store and doesn't not have log with him. Patient states he will call back office at [phone] option 2 this afternoon to review readings.

## 2022-11-16 ENCOUNTER — TELEPHONE (OUTPATIENT)
Dept: CARDIOLOGY CLINIC | Age: 42
End: 2022-11-16

## 2022-11-16 NOTE — TELEPHONE ENCOUNTER
Prior auth completed for smiley Clark. Shawn Guardian notified. Will await results of next PYP scan and if positive, will attempt again.

## 2022-11-17 ENCOUNTER — OFFICE VISIT (OUTPATIENT)
Dept: CARDIOLOGY CLINIC | Age: 42
End: 2022-11-17
Payer: MEDICAID

## 2022-11-17 ENCOUNTER — HOSPITAL ENCOUNTER (OUTPATIENT)
Dept: NON INVASIVE DIAGNOSTICS | Age: 42
Discharge: HOME | End: 2022-11-17
Attending: NURSE PRACTITIONER
Payer: MEDICAID

## 2022-11-17 VITALS
BODY MASS INDEX: 29.33 KG/M2 | RESPIRATION RATE: 18 BRPM | DIASTOLIC BLOOD PRESSURE: 94 MMHG | SYSTOLIC BLOOD PRESSURE: 128 MMHG | OXYGEN SATURATION: 95 % | WEIGHT: 198 LBS | HEART RATE: 62 BPM | HEIGHT: 69 IN | TEMPERATURE: 98.4 F

## 2022-11-17 VITALS
BODY MASS INDEX: 29.16 KG/M2 | SYSTOLIC BLOOD PRESSURE: 146 MMHG | WEIGHT: 196.87 LBS | HEIGHT: 69 IN | DIASTOLIC BLOOD PRESSURE: 93 MMHG

## 2022-11-17 DIAGNOSIS — E55.9 VITAMIN D DEFICIENCY: ICD-10-CM

## 2022-11-17 DIAGNOSIS — M10.9 GOUT, UNSPECIFIED CAUSE, UNSPECIFIED CHRONICITY, UNSPECIFIED SITE: ICD-10-CM

## 2022-11-17 DIAGNOSIS — R06.02 SHORTNESS OF BREATH: ICD-10-CM

## 2022-11-17 DIAGNOSIS — D50.8 OTHER IRON DEFICIENCY ANEMIA: ICD-10-CM

## 2022-11-17 DIAGNOSIS — I50.22 CHRONIC SYSTOLIC HEART FAILURE (HCC): ICD-10-CM

## 2022-11-17 DIAGNOSIS — I50.9 CONGESTIVE HEART FAILURE, UNSPECIFIED HF CHRONICITY, UNSPECIFIED HEART FAILURE TYPE (HCC): ICD-10-CM

## 2022-11-17 DIAGNOSIS — R53.83 FATIGUE, UNSPECIFIED TYPE: ICD-10-CM

## 2022-11-17 LAB
ECHO AO ROOT DIAM: 3.1 CM
ECHO AO ROOT INDEX: 1.51 CM/M2
ECHO AV AREA PEAK VELOCITY: 2 CM2
ECHO AV AREA/BSA PEAK VELOCITY: 1 CM2/M2
ECHO AV PEAK GRADIENT: 7 MMHG
ECHO AV PEAK VELOCITY: 1.3 M/S
ECHO AV VELOCITY RATIO: 0.62
ECHO EST RA PRESSURE: 3 MMHG
ECHO LA DIAMETER INDEX: 1.9 CM/M2
ECHO LA DIAMETER: 3.9 CM
ECHO LA TO AORTIC ROOT RATIO: 1.26
ECHO LA VOL 2C: 49 ML (ref 18–58)
ECHO LA VOL 4C: 21 ML (ref 18–58)
ECHO LA VOL BP: 36 ML (ref 18–58)
ECHO LA VOL/BSA BIPLANE: 18 ML/M2 (ref 16–34)
ECHO LA VOLUME AREA LENGTH: 38 ML
ECHO LA VOLUME INDEX A2C: 24 ML/M2 (ref 16–34)
ECHO LA VOLUME INDEX A4C: 10 ML/M2 (ref 16–34)
ECHO LA VOLUME INDEX AREA LENGTH: 19 ML/M2 (ref 16–34)
ECHO LV E' LATERAL VELOCITY: 7 CM/S
ECHO LV E' SEPTAL VELOCITY: 7 CM/S
ECHO LV FRACTIONAL SHORTENING: 30 % (ref 28–44)
ECHO LV INTERNAL DIMENSION DIASTOLE INDEX: 2.98 CM/M2
ECHO LV INTERNAL DIMENSION DIASTOLIC: 6.1 CM (ref 4.2–5.9)
ECHO LV INTERNAL DIMENSION SYSTOLIC INDEX: 2.1 CM/M2
ECHO LV INTERNAL DIMENSION SYSTOLIC: 4.3 CM
ECHO LV IVSD: 1.1 CM (ref 0.6–1)
ECHO LV MASS 2D: 253.9 G (ref 88–224)
ECHO LV MASS INDEX 2D: 123.9 G/M2 (ref 49–115)
ECHO LV POSTERIOR WALL DIASTOLIC: 0.9 CM (ref 0.6–1)
ECHO LV RELATIVE WALL THICKNESS RATIO: 0.3
ECHO LVOT AREA: 3.1 CM2
ECHO LVOT DIAM: 2 CM
ECHO LVOT MEAN GRADIENT: 1 MMHG
ECHO LVOT PEAK GRADIENT: 2 MMHG
ECHO LVOT PEAK VELOCITY: 0.8 M/S
ECHO LVOT STROKE VOLUME INDEX: 23.3 ML/M2
ECHO LVOT SV: 47.7 ML
ECHO LVOT VTI: 15.2 CM
ECHO MV A VELOCITY: 0.96 M/S
ECHO MV A VELOCITY: 0.96 M/S
ECHO MV AREA PHT: 5.8 CM2
ECHO MV E DECELERATION TIME (DT): 130.7 MS
ECHO MV E VELOCITY: 0.71 M/S
ECHO MV E VELOCITY: 0.71 M/S
ECHO MV PRESSURE HALF TIME (PHT): 37.9 MS
ECHO MV REGURGITANT PEAK GRADIENT: 6 MMHG
ECHO MV REGURGITANT PEAK VELOCITY: 1.2 M/S
ECHO PV MAX VELOCITY: 0.8 M/S
ECHO PV PEAK GRADIENT: 3 MMHG
ECHO RIGHT VENTRICULAR SYSTOLIC PRESSURE (RVSP): 21 MMHG
ECHO RV FREE WALL PEAK S': 14 CM/S
ECHO RV TAPSE: 2.1 CM (ref 1.7–?)
ECHO TV REGURGITANT MAX VELOCITY: 2.12 M/S
ECHO TV REGURGITANT PEAK GRADIENT: 18 MMHG

## 2022-11-17 PROCEDURE — 93306 TTE W/DOPPLER COMPLETE: CPT

## 2022-11-17 PROCEDURE — 99215 OFFICE O/P EST HI 40 MIN: CPT | Performed by: INTERNAL MEDICINE

## 2022-11-17 RX ORDER — SACUBITRIL AND VALSARTAN 24; 26 MG/1; MG/1
1 TABLET, FILM COATED ORAL 2 TIMES DAILY
Qty: 60 TABLET | Refills: 3 | Status: SHIPPED | OUTPATIENT
Start: 2022-11-17

## 2022-11-17 NOTE — PATIENT INSTRUCTIONS
Medication changes: We have sent a refill for your entresto    Start farxiga 5 mg daily  You have been prescribed Jordana Angeles as part of your heart failure regimen. Please see the information provided to explain how this medication works for your heart failure. It is very important to let our office know if you have any signs or symptoms of a urinary tract infection or yeast infection such as a burning feeling when passing urine, a need to urinate more often, the need to urinate right away, pain in the lower part of your stomach, or blood in the urine. Sometimes people also may have a fever, back pain, nausea or vomiting. Please call our office at [phone] for any of these symptoms. Please take this to your pharmacy to notify them of the change in medications. Testing Ordered:    none    Other Recommendations: We will call you with your echocardiogram results to determine further testing/recommendations     Ensure your drinking an adequate amount of water with a goal of 6-8 eight ounce glasses (1.5-2 liters) of fluid daily. Your urine should be clear and light yellow straw colored. If your blood pressure begins to consistently run below 90/60 and/or you begin to experience dizziness or lightheadedness, please contact the [facility] [place] [facility] [address] at [phone]. Follow up in mid-March with [facility]      Please monitor your weights daily upon waking and after using the bathroom. Keep a written records of your weights and bring to your next appointment. If you have a weight gain of 3 or more pounds overnight OR 5 or more pounds in one week please contact our office. Thank you for allowing us the privilege of being a part of your healthcare team! Please do not hesitate to contact our office at [phone] with any questions or concerns.        Virtual Heart Failure [address] invites you to learn more about heart failure and to share your questions, ideas, and experiences with others. Each month, the Heart Failure Support Group features a new educational topic and a guest speaker, followed by an interactive discussion. Our Heart Failure Nurse Navigator will moderate each session. You will be able to participate by phone, tablet or computer through [employer]. This support group takes place on the 3rd Thursday of each month from 6:00-7:30PM. All individuals living with heart failure and their caregivers are welcome to join. If you are interested in participating, please contact us at [email or web address] and you will be sent the link to join the [employer].

## 2022-11-17 NOTE — PROGRESS NOTES
[facility], [address] Note    Patient name: Edmar Aguayo  Patient : 1980  Patient MRN: [id number]  Date of service: 22    Primary care physician: None  Primary general cardiologist:      Primary [facility] cardiologist: Delmer Osborn MD    CHIEF COMPLAINT:  Chronic systolic heart failure    PLAN OF CARE:   37 y/o with h/o non-ischemic cardiomyopathy, LVEF improved from 10% to 30-35%, stage C, NYHA class I; cannot tolerate optimal GDMT due to hypotension   Patient has inherited aTTR; PYP and cMRI was negative for cardiac involvement, referred to Dr. Neftaly Pino at [address]:  Continue optimization of medical therapy for heart failure  Continue coreg 3.125mg BID, titration limited d/t hypotension  Continue entresto half tablet of 24/26mg twice daily  Continue spironolactone 25mg daily  Start farxiga 5mg daily  Continue lasix 40mg po daily   Continue allopurinol 100 mg PO daily  Was on chronic anticoagulation with warfarin for LV thrombus- was stopped by PCP per pt.      Does not take ASA or Statin  Pt had initial visit with Sleep Medicine, was not able to do in-office PSG d/t anxiety; pt declines further evaluation or consideration of home sleep study   HF labs today  Reinforced heart healthy, low salt diet  Reinforced appropriate fluid intake of 6 x 8oz glasses of water per day  Counseled on importance of THC wean; concern re: addiction disorder; pt has seen counselor; no UDS ordered, patient acknowledges current use   Document in diary BP/HR and daily weights  Referral for Primary Care in [place], [place]- needs to make appt   Pt would benefit from establishing care with a general cardiologist closer to home   Will send refererral to [facility] amyloid clinic for positive ATTR but negative PYP for consideration of prophylactic therapy  Return to [facility]HF Clinic in 4 weeks with NP      IMPRESSION:  Chronic systolic heart failure  Stage C, NYHA class I symptoms  Patient diagnosed with heart failure 3 years ago (per patient report, no records)  Reportedly non-ischemic cardiomyopathy, LVEF 10% at diagnosis, now improved to 30-35% (by echo 11/17/22)  H/o chronic troponin elevation  No record of [facility]   Invitae + aTTR; PYP negative (01/21) neg; cMRI neg for amyloid  +Coxsackie and Parvovirus Ab  H/o LV thrombus 2.7 x 3cm (by echo 9/2020)  H/o renal infarcts  H/o splenic infarcts  Chronic anticoagulation with coumadin- now off per PCP  Cardiac risk factors  HTN  HL  Former tobacco use, 1.25 pack years, quit 2015  Alcohol use, 5-6 drinks >4 times weekly, quit 9/2020  Marijuana, daily  CKD, stage 3 (baseline Cr 2.3)  Bronchitis  Anxiety     CARDIAC IMAGING AND DIAGNOSTICS REVIEWED:  Echo (11/17/22)  Left Ventricle: Moderately reduced left ventricular systolic function with a visually estimated EF of 30 - 35%. Left ventricle is moderately dilated. Normal wall thickness. Mild global hypokinesis present. Echo (2/17/21)   LV: Calculated LVEF is 21%. Biplane method used to measure ejection fraction. Severely dilated left ventricle. Upper normal wall thickness. Moderate-to-severely reduced systolic function. Severe (grade 3) left ventricular diastolic dysfunction. LA: Moderately dilated left atrium. RV: Mildly reduced systolic function. MV: Mild mitral valve regurgitation is present. IAS: There was no shunting at baseline. PA: Mild pulmonary hypertension. Pulmonary arterial systolic pressure is 35 mmHg. Contrast used: DEFINITY. Echo (9/16/20) LVEF 10%, LVEDD 5.4cm, LV thrombus 2.7x 3cm, reduced RV function, mild PAH     EKG (9/19/20) sinus tachycardia 102bpm, QRS 95ms     [facility] no records     cMRI (9/7/21)  1. Mildly dilated left ventricle by 3-D volumetric assessment with moderate left  ventricular systolic dysfunction. Mild global hypokinesis with regional  variation.  Moderate hypokinesis of the base to mid and distal anterior and  anteroseptal wall. Moderate hypokinesis of the mid to distal inferolateral wall. 3-D LVEF 34%. 2. Normal right ventricular size and systolic function. RVEF 60%. 3. No significant valvular disease. 4. On EGE and LGE study, there is patchy areas of enhancement present. There is  an area of mid wall septal stripe enhancement of the base to mid septal wall. There is a focal area of mid myocardial enhancement of the mid inferior wall. There is a focal area of subepicardial enhancement of the base to mid  inferolateral wall. All these findings suggest possibility of an old healed  myocarditis. These features are not consistent with infiltrative cardiac  sarcoidosis or cardiac amyloidosis. There is no features of recent or old  myocardial infarction. 5. Normal pleura and pericardium. There is no significant effusions. PYP (1/21/21): PYP scan is not suggestive for ATTR cardiac amyloidosis based on the H:CL ratio of 1.0 and semiquantitative score of 1.     6 Min Walk Report 10/19/2022 4/26/2022 9/14/2021 3/17/2021 2/17/2021   (PRE) HR 85 74 81 88 70   (PRE) O2 Sat 98 98 98 98 98   (POST) HR 91 93 79 94 108   (POST) O2 Sat 97 96 97 85 96   Distance in Meters 386.49 377.34 400.66 362.1 369.75       HISTORY OF PRESENT ILLNESS:  I had the pleasure of seeing Thomas Garcia in [address] [place] [address] at [facility] in [place]. Briefly, Thomas Garcia is a 36 y.o. male with h/o tobacco (quit 2015), alcohol (quit 9/2020) and THC use (daily) and strong family history of several members with early cardiomyopathy and cardiac deaths. Patient presented with chronic systolic heart failure, stage C, NYHA class IIIA symptoms, states was diagnosed with heart failure 3 years ago (per patient report, no records). He was off medications for several months.  Patient was then admitted with acute heart failure, reportedly non-ischemic cardiomyopathy, LVEF 10% (no record of [facility]), LV thrombus 2.7 x 3cm (by echo 9/2020) c/b splenic and renal infarcts started on chronic anticoagulation with coumadin. Discharged home with lifevest and referred to AHF Clinic      Patient lives 2.5 hours from [place], he lives closer to [place] but for the next couple of months would like to follow with us. If needs heart transplant [facility] would be the closer site. INTERVAL HISTORY:  Today, patient presents for routine clinic visit. Patient is doing well. Still using marijuana. Patient walked to our clinic from parking garage without having to slow down or stop. Patient can walk more than one block without symptoms of fatigue or shortness of breath or chest pain. Patient can walk one flight of stairs without symptoms of fatigue or shortness of breath or chest pain. Patient can perform home activities without problem. Patient denies symptoms of volume overload or leg edema. Patient denies abdominal bloating or change of appetite. Patient's weight remained stable. Patient denies orthopnea, PND or nocturia. Patient denies irregular heart rate or palpitations. No presyncope or syncope. Patient denies other cardiac symptoms such as chest pain or leg pain with walking. Patient is compliant with fluid restriction and taking medications as prescribed. Patient manages his own medications. PHYSICAL EXAM:  Visit Vitals  BP (!) 128/94 (BP 1 Location: Left upper arm, BP Patient Position: Sitting, BP Cuff Size: Large adult)   Pulse 62   Temp 98.4 °F (36.9 °C) (Oral)   Resp 18   Ht 5' 9\" (1.753 m)   Wt 198 lb (89.8 kg)   SpO2 95%   BMI 29.24 kg/m²     General: Patient is well developed, well-nourished in no acute distress  HEENT: Unremarkable   Neck: Supple. No evidence of thyroid enlargements or lymphadenopathy. JVD: Cannot be appreciated   Lungs: Breath sounds are equal and clear bilaterally. No wheezes, rhonchi, or rales. Heart: Regular rate and rhythm with normal S1 and S2.  No murmurs, gallops or rubs.  Abdomen: Soft, no mass or tenderness. No organomegaly or hernia. Bowel sounds present. Genitourinary and rectal: deferred  Extremities: No cyanosis, clubbing, or edema. Neurologic: No focal sensory or motor deficits are noted. Grossly intact. Psychiatric: Awake, alert an doriented x 3. Appropriate mood and affect. Skin: Warm, dry and well perfused. REVIEW OF SYSTEMS:  General: Denies fever. Ear, nose and throat: Denies difficulty hearing, sinus problems, nosebleeds  Cardiovascular: see above in the interval history  Respiratory: Denies cough, wheezing, sputum production, hemoptysis. Gastrointestinal: Denies heartburn, constipation, diarrhea, abdominal pain, nausea, blood in stool  Kidney and bladder: Denies painful urination, frequent urination  Musculoskeletal: Denies joint pain, muscle weakness  Skin and hair: Denies change in existing skin lesions    PAST MEDICAL HISTORY:  Past Medical History:   Diagnosis Date    Congestive heart failure ([facility] [address]        PAST SURGICAL HISTORY:  History reviewed. No pertinent surgical history. FAMILY HISTORY:  History reviewed. No pertinent family history. SOCIAL HISTORY:  Social History     Socioeconomic History    Marital status: SINGLE   Tobacco Use    Smoking status: Former     Types: Cigarettes     Quit date: 1/1/2012     Years since quitting: 10.9    Smokeless tobacco: Never    Tobacco comments:     1 cigarette q4 days per pt   Vaping Use    Vaping Use: Never used   Substance and Sexual Activity    Alcohol use:  Yes     Alcohol/week: 1.0 standard drink     Types: 1 Cans of beer per week     Comment: 1 beer per day    Drug use: Yes     Types: Marijuana     Comment: moderate use of marijuana per patient    Sexual activity: Not Currently       LABORATORY RESULTS:  Labs Latest Ref Rng & Units 10/19/2022   WBC 4.1 - 11.1 K/uL 6.7   RBC 4.10 - 5.70 M/uL 4.74   Hemoglobin 12.1 - 17.0 g/dL 14.3   Hematocrit 36.6 - 50.3 % 45.5   MCV 80.0 - 99.0 FL 96.0   Platelets 150 - 400 K/uL 188   Albumin 3.5 - 5.0 g/dL 4.0   Calcium 8.5 - 10.1 MG/DL 9.5   Glucose 65 - 100 mg/dL 148(H)   BUN 6 - 20 MG/DL 14   Creatinine 0.70 - 1.30 MG/DL 1.32(H)   Sodium 136 - 145 mmol/L 137   Potassium 3.5 - 5.1 mmol/L 4.2   TSH 0.36 - 3.74 uIU/mL 1.31   Some recent data might be hidden       ALLERGY:  No Known Allergies     CURRENT MEDICATIONS:    Current Outpatient Medications:     sacubitriL-valsartan (Entresto) 24-26 mg tablet, Take 1 Tablet by mouth two (2) times a day., Disp: 60 Tablet, Rfl: 3    dapagliflozin (FARXIGA) 5 mg tab tablet, Take 1 Tablet by mouth daily. , Disp: 30 Tablet, Rfl: 3    allopurinoL (ZYLOPRIM) 100 mg tablet, TAKE 1 TABLET BY MOUTH DAILY. , Disp: 30 Tablet, Rfl: 2    carvediloL (COREG) 6.25 mg tablet, Take 1 Tablet by mouth two (2) times daily (with meals). , Disp: 60 Tablet, Rfl: 1    pantoprazole (PROTONIX) 20 mg tablet, TAKE 2 TABLETS BY MOUTH ONCE DAILY, Disp: 60 Tablet, Rfl: 0    cholecalciferol (VITAMIN D3) (2,000 UNITS /50 MCG) cap capsule, Take 1 Capsule by mouth daily. , Disp: 30 Capsule, Rfl: 3    magnesium oxide (MAG-OX) 400 mg tablet, TAKE 1 TABLET BY MOUTH ONCE A DAY, Disp: 30 Tablet, Rfl: 1    potassium chloride (K-DUR, KLOR-CON M20) 20 mEq tablet, Take 0.5 Tablets by mouth daily. May take an additional 20 mEq daily PRN as directed by Edilberto Jaime 1721., Disp: 30 Tablet, Rfl: 2    spironolactone (ALDACTONE) 25 mg tablet, Take 0.5 Tablets by mouth daily. , Disp: 15 Tablet, Rfl: 2    furosemide (LASIX) 40 mg tablet, Take 1 Tablet by mouth daily. , Disp: 30 Tablet, Rfl: 3    Thank you for your referral and allowing me to participate in this patient's care.     Vicente De La Fuente MD PhD  [address], [address]  Phone: [phone]  Fax: [phone]    PATIENT CARE TEAM:  Patient Care Team:  None as PCP - Leo Delgado MD (Cardiovascular Disease Physician)     Total visit time: 27 minutes  (> 50% spent face-to-face counseling)

## 2022-12-02 ENCOUNTER — ANCILLARY PROCEDURE (OUTPATIENT)
Dept: CARDIOLOGY CLINIC | Age: 42
End: 2022-12-02
Payer: MEDICAID

## 2022-12-02 VITALS
BODY MASS INDEX: 29.62 KG/M2 | DIASTOLIC BLOOD PRESSURE: 93 MMHG | SYSTOLIC BLOOD PRESSURE: 146 MMHG | HEIGHT: 69 IN | WEIGHT: 200 LBS

## 2022-12-02 DIAGNOSIS — R06.02 SHORTNESS OF BREATH: ICD-10-CM

## 2022-12-02 DIAGNOSIS — E55.9 VITAMIN D DEFICIENCY: ICD-10-CM

## 2022-12-02 DIAGNOSIS — I50.22 CHRONIC SYSTOLIC HEART FAILURE (HCC): ICD-10-CM

## 2022-12-02 DIAGNOSIS — D50.8 OTHER IRON DEFICIENCY ANEMIA: ICD-10-CM

## 2022-12-02 DIAGNOSIS — R53.83 FATIGUE, UNSPECIFIED TYPE: ICD-10-CM

## 2022-12-02 RX ORDER — TETRAKIS(2-METHOXYISOBUTYLISOCYANIDE)COPPER(I) TETRAFLUOROBORATE 1 MG/ML
40 INJECTION, POWDER, LYOPHILIZED, FOR SOLUTION INTRAVENOUS ONCE
Status: COMPLETED | OUTPATIENT
Start: 2022-12-02 | End: 2022-12-02

## 2022-12-02 RX ADMIN — TETRAKIS(2-METHOXYISOBUTYLISOCYANIDE)COPPER(I) TETRAFLUOROBORATE 16 MILLICURIE: 1 INJECTION, POWDER, LYOPHILIZED, FOR SOLUTION INTRAVENOUS at 08:20

## 2022-12-04 LAB
NUC 3 HOUR HEART TO CONTRALATERAL RATIO: 1.4
STRESS TARGET HR: 177 BPM

## 2022-12-09 RX ORDER — PANTOPRAZOLE SODIUM 20 MG/1
TABLET, DELAYED RELEASE ORAL
Qty: 60 TABLET | Refills: 0 | Status: SHIPPED | OUTPATIENT
Start: 2022-12-09

## 2022-12-29 DIAGNOSIS — M10.9 GOUT, UNSPECIFIED CAUSE, UNSPECIFIED CHRONICITY, UNSPECIFIED SITE: ICD-10-CM

## 2022-12-29 DIAGNOSIS — I50.9 CONGESTIVE HEART FAILURE, UNSPECIFIED HF CHRONICITY, UNSPECIFIED HEART FAILURE TYPE (HCC): ICD-10-CM

## 2022-12-29 RX ORDER — SPIRONOLACTONE 25 MG/1
TABLET ORAL
Qty: 15 TABLET | Refills: 2 | Status: SHIPPED | OUTPATIENT
Start: 2022-12-29

## 2023-01-18 RX ORDER — CARVEDILOL 6.25 MG/1
TABLET ORAL
Qty: 60 TABLET | Refills: 1 | Status: SHIPPED | OUTPATIENT
Start: 2023-01-18

## 2023-01-19 ENCOUNTER — TELEPHONE (OUTPATIENT)
Dept: CARDIOLOGY CLINIC | Age: 43
End: 2023-01-19

## 2023-01-19 NOTE — TELEPHONE ENCOUNTER
Patient is calling for refill of Viagra, he states it was prescribed by this office previously (in July), and now he would like more. I advised him to call PCP for refills, but he states he does not have a PCP and he insists this office fill. Attempted to call patient, vm full, with message per Nati Agrawal: We can send a referral to a new PCP if he wants. We will not refill for ED meds from the Heart Failure Office. Per my note when I sent the rx: \"Ok to trial PDE Inhibitor for ED- discussed with pt that he needs to see PCP for this, will send 4 tabs to trial response and will need further refills from PCP\"     Patient returned call, I gave him above information, he states understanding.

## 2023-01-24 RX ORDER — PANTOPRAZOLE SODIUM 20 MG/1
TABLET, DELAYED RELEASE ORAL
Qty: 60 TABLET | Refills: 0 | Status: SHIPPED | OUTPATIENT
Start: 2023-01-24

## 2023-02-15 ENCOUNTER — TELEPHONE (OUTPATIENT)
Dept: CARDIOLOGY CLINIC | Age: 43
End: 2023-02-15

## 2023-02-15 NOTE — TELEPHONE ENCOUNTER
Second attempt to reach pt to schedule March follow-up. Unable to reach. Pt's contact number has \"calling restrictions preventing completion of call. \"

## 2023-02-28 ENCOUNTER — TELEPHONE (OUTPATIENT)
Dept: CARDIOLOGY CLINIC | Age: 43
End: 2023-02-28

## 2023-02-28 RX ORDER — LANOLIN ALCOHOL/MO/W.PET/CERES
400 CREAM (GRAM) TOPICAL DAILY
Qty: 30 TABLET | Refills: 0 | Status: SHIPPED | OUTPATIENT
Start: 2023-02-28

## 2023-02-28 NOTE — TELEPHONE ENCOUNTER
Refill request received for magnesium from [facility] pharmacy. Last office visit in November,  has been unable to reach patient to schedule follow up and had sent letter out. RN to check with provider what to do. \    Requested Prescriptions     Signed Prescriptions Disp Refills    magnesium oxide (MAG-OX) 400 mg tablet 30 Tablet 0     Sig: Take 1 Tablet by mouth daily. Authorizing Provider: Aleshia Jeffrey     Ordering User: Betty Martinez     Unable to call patient due to restricted calling, uable to leave message.  Left message for patients mother to have patient call us at [phone] option 2

## 2023-03-19 DIAGNOSIS — I50.9 CONGESTIVE HEART FAILURE, UNSPECIFIED HF CHRONICITY, UNSPECIFIED HEART FAILURE TYPE (HCC): ICD-10-CM

## 2023-03-19 DIAGNOSIS — M10.9 GOUT, UNSPECIFIED CAUSE, UNSPECIFIED CHRONICITY, UNSPECIFIED SITE: ICD-10-CM

## 2023-03-20 RX ORDER — SPIRONOLACTONE 25 MG/1
TABLET ORAL
Qty: 15 TABLET | Refills: 2 | Status: SHIPPED | OUTPATIENT
Start: 2023-03-20

## 2023-05-31 RX ORDER — ACETAMINOPHEN 160 MG
TABLET,DISINTEGRATING ORAL
Qty: 30 CAPSULE | Refills: 3 | OUTPATIENT
Start: 2023-05-31

## 2023-05-31 RX ORDER — CARVEDILOL 6.25 MG/1
TABLET ORAL
Qty: 60 TABLET | Refills: 1 | OUTPATIENT
Start: 2023-05-31

## 2023-07-03 RX ORDER — SPIRONOLACTONE 25 MG/1
TABLET ORAL
Qty: 15 TABLET | Refills: 2 | OUTPATIENT
Start: 2023-07-03

## 2023-08-01 ENCOUNTER — TELEPHONE (OUTPATIENT)
Age: 43
End: 2023-08-01

## 2023-08-01 NOTE — TELEPHONE ENCOUNTER
PA request received for farxiga. Upon chart review patient has not been seen since November, was due to follow up in December. RN called patient, he was under the impression that we no longer accepted his insurance. RN checked with front office-Shila. Patient willing to scheduled office visit and continue care with [facility]. Shila to schedule appt. PA completed.

## 2023-08-01 NOTE — TELEPHONE ENCOUNTER
Attempted to get patient readmitted into [facility] and schedule an appointment for him, but there was no answer. I could not leave a message, patient's mailbox is full. Will attempt to schedule patient during the course of this week.

## 2023-08-20 DIAGNOSIS — I50.9 HEART FAILURE, UNSPECIFIED (HCC): ICD-10-CM

## 2023-08-20 DIAGNOSIS — M10.9 GOUT, UNSPECIFIED: ICD-10-CM

## 2023-08-21 RX ORDER — SACUBITRIL AND VALSARTAN 24; 26 MG/1; MG/1
TABLET, FILM COATED ORAL
Qty: 60 TABLET | Refills: 3 | OUTPATIENT
Start: 2023-08-21

## 2023-08-21 RX ORDER — DAPAGLIFLOZIN 5 MG/1
TABLET, FILM COATED ORAL
Qty: 30 TABLET | Refills: 3 | OUTPATIENT
Start: 2023-08-21

## 2024-02-08 ENCOUNTER — TELEPHONE (OUTPATIENT)
Age: 44
End: 2024-02-08

## 2024-02-08 NOTE — TELEPHONE ENCOUNTER
Attempted to reach pt to get him scheduled for mid-March follow-up.  His voicemail was full, so I left a detailed message with his mother asking him to please call me back.

## 2024-02-27 RX ORDER — ACETAMINOPHEN 160 MG
2000 TABLET,DISINTEGRATING ORAL DAILY
Qty: 30 CAPSULE | Refills: 3 | OUTPATIENT
Start: 2024-02-27

## 2024-04-03 ENCOUNTER — TELEPHONE (OUTPATIENT)
Age: 44
End: 2024-04-03

## 2024-04-08 ENCOUNTER — TELEPHONE (OUTPATIENT)
Age: 44
End: 2024-04-08

## 2024-04-08 NOTE — TELEPHONE ENCOUNTER
Pt called to cx today's appt b/c he got sick after starting the long drive to his appt.  He does not sound well at all and will call us back when he feels better to reschedule.

## 2024-04-11 ENCOUNTER — TELEPHONE (OUTPATIENT)
Age: 44
End: 2024-04-11

## 2024-04-16 ENCOUNTER — TELEPHONE (OUTPATIENT)
Age: 44
End: 2024-04-16

## 2024-04-16 NOTE — TELEPHONE ENCOUNTER
Telephone Call RE:  Appointment reminder     Outcome:     [] Patient confirmed appointment   [] Patient rescheduled appointment for    [x] Unable to reach  [] Left message              [] Other:       Mailbox full.

## 2024-04-16 NOTE — PROGRESS NOTES
ADVANCED HEART FAILURE CENTER  [facility] in [place], [place]  Heart Failure Outpatient Clinic Note    Patient name: Mario Alberto Vásquez  Patient : 1980  Patient MRN: [id number]  Date of service: 24    Primary care physician: None, None  Primary F cardiologist: [facility]      CHIEF COMPLAINT:  Follow up for chronic systolic heart failure    CURRENT VISIT (24):    Patient presents for routine clinic visit.  He was last seen with AHF in .  He has had a HF hospitalization at [facility] a few months ago.  He said he was diureses 80 pounds and felt much better a discharge but now he is starting to feel worse again. He is here to re-establish care.  He does not feel great. Walking any distances makes him SOB, has chronic fatigue, denies orthopnea, PND, chest pain, palpitations, dizziness, or syncope.  Weight has been stable. He does not take his BP.  He is compliant with medications.         ASSESSMENT:  Mario Alberto Vásquez is a 45 y/o with h/o non-ischemic cardiomyopathy, LVEF improved from 10% to 30-35%, stage C, NYHA class I; cannot tolerate optimal GDMT due to hypotension   Patient has inherited aTTR; PYP and cMRI was negative for cardiac involvement, referred to Dr. Valverde at [facility] previously.  Will need annual survalliance PYP for cardiac amyloid    PLAN:  Heart failure/Cardiomyopathy: EF 30-35%)  NYHA class II-III  Continue current medical therapy for heart failure:  Beta-blocker: continue coreg 12.5  ACE/ARB/ARNi: restart Entresto 24-26 BID 1/2 tab BID, hold for SBP<90  Hydralazine/Isordil: will not start until BB and ARNI are optimized  MRA: not taking spironolactone, will restart at next visit if he does well with restarting Entresto  SGLT2 inhibitor: Continue Jardiance 10 mg daily  Diuretic: Cont lasix 40 mg daily  Not on allopurinol or uloric, check uric acid level   Labs: CMP, Mg, NT pro-BNP, uric acid, lipid panel, uric acid, iron, TIBC, CBC, ferritin today  Repeat Echo ordered today  May

## 2024-04-17 ENCOUNTER — OFFICE VISIT (OUTPATIENT)
Age: 44
End: 2024-04-17
Payer: MEDICARE

## 2024-04-17 VITALS
HEART RATE: 81 BPM | OXYGEN SATURATION: 99 % | TEMPERATURE: 97.9 F | SYSTOLIC BLOOD PRESSURE: 132 MMHG | BODY MASS INDEX: 26.26 KG/M2 | DIASTOLIC BLOOD PRESSURE: 102 MMHG | WEIGHT: 177.8 LBS

## 2024-04-17 DIAGNOSIS — R53.83 FATIGUE, UNSPECIFIED TYPE: ICD-10-CM

## 2024-04-17 DIAGNOSIS — E55.9 VITAMIN D DEFICIENCY: ICD-10-CM

## 2024-04-17 DIAGNOSIS — I50.40 COMBINED SYSTOLIC AND DIASTOLIC HEART FAILURE, UNSPECIFIED HF CHRONICITY (HCC): ICD-10-CM

## 2024-04-17 DIAGNOSIS — I50.40 COMBINED SYSTOLIC AND DIASTOLIC HEART FAILURE, UNSPECIFIED HF CHRONICITY (HCC): Primary | ICD-10-CM

## 2024-04-17 DIAGNOSIS — D50.8 OTHER IRON DEFICIENCY ANEMIAS: ICD-10-CM

## 2024-04-17 DIAGNOSIS — I50.22 HEART FAILURE, SYSTOLIC, CHRONIC (HCC): ICD-10-CM

## 2024-04-17 LAB
DISTANCE WALKED: NORMAL
SPO2: NORMAL

## 2024-04-17 PROCEDURE — 99215 OFFICE O/P EST HI 40 MIN: CPT | Performed by: NURSE PRACTITIONER

## 2024-04-17 PROCEDURE — 4004F PT TOBACCO SCREEN RCVD TLK: CPT | Performed by: NURSE PRACTITIONER

## 2024-04-17 PROCEDURE — 93000 ELECTROCARDIOGRAM COMPLETE: CPT | Performed by: NURSE PRACTITIONER

## 2024-04-17 PROCEDURE — 94618 PULMONARY STRESS TESTING: CPT | Performed by: NURSE PRACTITIONER

## 2024-04-17 PROCEDURE — G8419 CALC BMI OUT NRM PARAM NOF/U: HCPCS | Performed by: NURSE PRACTITIONER

## 2024-04-17 PROCEDURE — G8427 DOCREV CUR MEDS BY ELIG CLIN: HCPCS | Performed by: NURSE PRACTITIONER

## 2024-04-17 RX ORDER — TORSEMIDE 20 MG/1
40 TABLET ORAL DAILY
Qty: 30 TABLET | Refills: 3 | Status: SHIPPED | OUTPATIENT
Start: 2024-04-17

## 2024-04-17 RX ORDER — CARVEDILOL 12.5 MG/1
12.5 TABLET ORAL 2 TIMES DAILY WITH MEALS
Qty: 60 TABLET | Refills: 3 | Status: SHIPPED | OUTPATIENT
Start: 2024-04-17

## 2024-04-17 RX ORDER — ATORVASTATIN CALCIUM 40 MG/1
40 TABLET, FILM COATED ORAL DAILY
Qty: 30 TABLET | Refills: 3 | Status: SHIPPED | OUTPATIENT
Start: 2024-04-17

## 2024-04-17 RX ORDER — TORSEMIDE 20 MG/1
40 TABLET ORAL DAILY
COMMUNITY
End: 2024-04-17 | Stop reason: SDUPTHER

## 2024-04-17 RX ORDER — ATORVASTATIN CALCIUM 40 MG/1
40 TABLET, FILM COATED ORAL DAILY
COMMUNITY
End: 2024-04-17 | Stop reason: SDUPTHER

## 2024-04-17 NOTE — PATIENT INSTRUCTIONS
failure support group, this will be the last Wednesday of every month from 5-6pm at [facility], [place]. If you would like to attend you will need to RSVP to [email or web address]

## 2024-04-18 ENCOUNTER — TELEPHONE (OUTPATIENT)
Age: 44
End: 2024-04-18

## 2024-04-18 DIAGNOSIS — I50.40 COMBINED SYSTOLIC AND DIASTOLIC HEART FAILURE, UNSPECIFIED HF CHRONICITY (HCC): Primary | ICD-10-CM

## 2024-04-18 DIAGNOSIS — E61.1 IRON DEFICIENCY: Primary | ICD-10-CM

## 2024-04-18 LAB
25(OH)D3 SERPL-MCNC: 15.6 NG/ML (ref 30–100)
ALBUMIN SERPL-MCNC: 3.5 G/DL (ref 3.5–5)
ALBUMIN/GLOB SERPL: 1 (ref 1.1–2.2)
ALP SERPL-CCNC: 173 U/L (ref 45–117)
ALT SERPL-CCNC: 33 U/L (ref 12–78)
ANION GAP SERPL CALC-SCNC: 9 MMOL/L (ref 5–15)
AST SERPL-CCNC: 32 U/L (ref 15–37)
BASOPHILS # BLD: 0 K/UL (ref 0–0.1)
BASOPHILS NFR BLD: 0 % (ref 0–1)
BILIRUB SERPL-MCNC: 1.1 MG/DL (ref 0.2–1)
BUN SERPL-MCNC: 24 MG/DL (ref 6–20)
BUN/CREAT SERPL: 14 (ref 12–20)
CALCIUM SERPL-MCNC: 9.4 MG/DL (ref 8.5–10.1)
CHLORIDE SERPL-SCNC: 110 MMOL/L (ref 97–108)
CHOLEST SERPL-MCNC: 116 MG/DL
CO2 SERPL-SCNC: 23 MMOL/L (ref 21–32)
CREAT SERPL-MCNC: 1.75 MG/DL (ref 0.7–1.3)
DIFFERENTIAL METHOD BLD: ABNORMAL
EOSINOPHIL # BLD: 0.1 K/UL (ref 0–0.4)
EOSINOPHIL NFR BLD: 1 % (ref 0–7)
ERYTHROCYTE [DISTWIDTH] IN BLOOD BY AUTOMATED COUNT: 19.9 % (ref 11.5–14.5)
FERRITIN SERPL-MCNC: 23 NG/ML (ref 26–388)
GLOBULIN SER CALC-MCNC: 3.6 G/DL (ref 2–4)
GLUCOSE SERPL-MCNC: 96 MG/DL (ref 65–100)
HCT VFR BLD AUTO: 45.3 % (ref 36.6–50.3)
HDLC SERPL-MCNC: 45 MG/DL
HDLC SERPL: 2.6 (ref 0–5)
HGB BLD-MCNC: 14.2 G/DL (ref 12.1–17)
IMM GRANULOCYTES # BLD AUTO: 0 K/UL (ref 0–0.04)
IMM GRANULOCYTES NFR BLD AUTO: 0 % (ref 0–0.5)
IRON SATN MFR SERPL: 7 % (ref 20–50)
IRON SERPL-MCNC: 30 UG/DL (ref 35–150)
LDLC SERPL CALC-MCNC: 48.8 MG/DL (ref 0–100)
LYMPHOCYTES # BLD: 2 K/UL (ref 0.8–3.5)
LYMPHOCYTES NFR BLD: 27 % (ref 12–49)
MAGNESIUM SERPL-MCNC: 2.3 MG/DL (ref 1.6–2.4)
MCH RBC QN AUTO: 27.2 PG (ref 26–34)
MCHC RBC AUTO-ENTMCNC: 31.3 G/DL (ref 30–36.5)
MCV RBC AUTO: 86.8 FL (ref 80–99)
MONOCYTES # BLD: 0.6 K/UL (ref 0–1)
MONOCYTES NFR BLD: 8 % (ref 5–13)
NEUTS SEG # BLD: 4.7 K/UL (ref 1.8–8)
NEUTS SEG NFR BLD: 64 % (ref 32–75)
NRBC # BLD: 0 K/UL (ref 0–0.01)
NRBC BLD-RTO: 0 PER 100 WBC
NT PRO BNP: 9924 PG/ML
PLATELET # BLD AUTO: 261 K/UL (ref 150–400)
PMV BLD AUTO: 11.5 FL (ref 8.9–12.9)
POTASSIUM SERPL-SCNC: 4.5 MMOL/L (ref 3.5–5.1)
PROT SERPL-MCNC: 7.1 G/DL (ref 6.4–8.2)
RBC # BLD AUTO: 5.22 M/UL (ref 4.1–5.7)
SODIUM SERPL-SCNC: 142 MMOL/L (ref 136–145)
TIBC SERPL-MCNC: 411 UG/DL (ref 250–450)
TRIGL SERPL-MCNC: 111 MG/DL
URATE SERPL-MCNC: 10.2 MG/DL (ref 3.5–7.2)
VLDLC SERPL CALC-MCNC: 22.2 MG/DL
WBC # BLD AUTO: 7.3 K/UL (ref 4.1–11.1)

## 2024-04-18 RX ORDER — HEPARIN SODIUM (PORCINE) LOCK FLUSH IV SOLN 100 UNIT/ML 100 UNIT/ML
500 SOLUTION INTRAVENOUS PRN
OUTPATIENT
Start: 2024-04-18

## 2024-04-18 RX ORDER — ALBUTEROL SULFATE 90 UG/1
4 AEROSOL, METERED RESPIRATORY (INHALATION) PRN
OUTPATIENT
Start: 2024-04-18

## 2024-04-18 RX ORDER — SODIUM CHLORIDE 0.9 % (FLUSH) 0.9 %
5-40 SYRINGE (ML) INJECTION PRN
OUTPATIENT
Start: 2024-04-18

## 2024-04-18 RX ORDER — EPINEPHRINE 1 MG/ML
0.3 INJECTION, SOLUTION, CONCENTRATE INTRAVENOUS PRN
OUTPATIENT
Start: 2024-04-18

## 2024-04-18 RX ORDER — ONDANSETRON 2 MG/ML
8 INJECTION INTRAMUSCULAR; INTRAVENOUS
OUTPATIENT
Start: 2024-04-18

## 2024-04-18 RX ORDER — SODIUM CHLORIDE 9 MG/ML
5-250 INJECTION, SOLUTION INTRAVENOUS PRN
OUTPATIENT
Start: 2024-04-18

## 2024-04-18 RX ORDER — ERGOCALCIFEROL 1.25 MG/1
50000 CAPSULE ORAL WEEKLY
Qty: 8 CAPSULE | Refills: 0 | Status: SHIPPED | OUTPATIENT
Start: 2024-04-18

## 2024-04-18 RX ORDER — DIPHENHYDRAMINE HYDROCHLORIDE 50 MG/ML
50 INJECTION INTRAMUSCULAR; INTRAVENOUS
OUTPATIENT
Start: 2024-04-18

## 2024-04-18 RX ORDER — ACETAMINOPHEN 325 MG/1
650 TABLET ORAL
OUTPATIENT
Start: 2024-04-18

## 2024-04-18 RX ORDER — FAMOTIDINE 10 MG/ML
20 INJECTION, SOLUTION INTRAVENOUS
OUTPATIENT
Start: 2024-04-18

## 2024-04-18 RX ORDER — SODIUM CHLORIDE 9 MG/ML
INJECTION, SOLUTION INTRAVENOUS CONTINUOUS
OUTPATIENT
Start: 2024-04-18

## 2024-04-18 NOTE — TELEPHONE ENCOUNTER
----- Message from RENAN Villatoro NP sent at 4/18/2024 10:59 AM EDT -----  Regarding: labs  Labs reviewed:  1) Vit D low please order high dose vit D 50,000 IU weekly for 8 weeks followed by Vit D 3 1000 IU daily  2) ferritin low, iron sat low.  Please order IV iron infusions (preferably Injectafer 750mg x 2 doses)  3) BNP High at 9k, please tell pt to take 40mg lasix BID instead of daily for 5 days then go back to daily  4) Cr elevated, probably due to fluid overload, diuresis as above, recheck BMP in 1 week.  ----- Message -----  From: Anusha Frias Incoming Kerwin [employer]  Sent: 4/18/2024   1:51 AM EDT  To: RENAN Villatoro NP    @4050 - spoke with Mario Alberto Vásquez regarding the above information and resulting medication additions and changes. He verbalized understanding. He asked about having the Iron infusions locally to him in [place] or [place]. He understands that that might not be possible, in which case he is willing to go to [facility].     @1400 - spoke with Mr. Vásquez to inform him Injectafer infusions were arranged at [facility] and to expect a call from them to arrange the infusion dates.

## 2024-04-30 LAB
BUN SERPL-MCNC: 28 MG/DL (ref 6–24)
BUN/CREAT SERPL: 15 (ref 9–20)
CALCIUM SERPL-MCNC: 9.5 MG/DL (ref 8.7–10.2)
CHLORIDE SERPL-SCNC: 106 MMOL/L (ref 96–106)
CO2 SERPL-SCNC: 21 MMOL/L (ref 20–29)
CREAT SERPL-MCNC: 1.92 MG/DL (ref 0.76–1.27)
EGFRCR SERPLBLD CKD-EPI 2021: 43 ML/MIN/1.73
GLUCOSE SERPL-MCNC: 92 MG/DL (ref 70–99)
POTASSIUM SERPL-SCNC: 4.7 MMOL/L (ref 3.5–5.2)
SODIUM SERPL-SCNC: 145 MMOL/L (ref 134–144)

## 2024-05-01 ENCOUNTER — TELEPHONE (OUTPATIENT)
Age: 44
End: 2024-05-01

## 2024-05-01 NOTE — TELEPHONE ENCOUNTER
----- Message from RENAN Epperson NP sent at 5/1/2024  2:56 PM EDT -----  His kidney function worsened further.  Can you please check in on his weights and blood pressures and see if he took the diuretic as Shayy had directed?   Knowing these will help me determine what further orders needed.        Called patient to review above, he is not at home and does not have weights, RN will call back tomorrow (not before 10) Patient confirms that he increased torsemide (for 1 week)    1255pm- Called patient back, no answer, unable to leave voicemail message (mailbox full)    5/14- called patient again, no answer mailbox still full. Closed encounter

## 2024-05-16 ENCOUNTER — ANCILLARY PROCEDURE (OUTPATIENT)
Age: 44
End: 2024-05-16
Payer: MEDICARE

## 2024-05-16 DIAGNOSIS — I50.40 COMBINED SYSTOLIC AND DIASTOLIC HEART FAILURE, UNSPECIFIED HF CHRONICITY (HCC): ICD-10-CM

## 2024-05-16 PROCEDURE — 78803 RP LOCLZJ TUM SPECT 1 AREA: CPT | Performed by: INTERNAL MEDICINE

## 2024-05-16 PROCEDURE — A9538 TC99M PYROPHOSPHATE: HCPCS | Performed by: INTERNAL MEDICINE

## 2024-05-16 RX ADMIN — Medication 16.3 MILLICURIE: at 10:55

## 2024-05-21 ENCOUNTER — TELEPHONE (OUTPATIENT)
Age: 44
End: 2024-05-21

## 2024-05-21 LAB — NUC 3 HOUR HEART TO CONTRALATERAL RATIO: 1.16

## 2024-05-21 NOTE — TELEPHONE ENCOUNTER
----- Message from RENAN Villatoro NP sent at 5/21/2024  2:59 PM EDT -----  Regarding: PYP results  Pt does not have mychart.  PYP equivocal/ not suggestive of ATTR amyloidosis.   ----- Message -----  From: Bobby Pulido MD  Sent: 5/21/2024   1:55 PM EDT  To: RENAN Villatoro NP    @1547 - Attempted to reach Mario Alberto Vásquez to inform him of the above lab results. His voicemail was full and I was not able to leave a message.  ---  5/22 @1007 - spoke with Mario Alberto Thapa and provided him with the above results from the PYP study. He verbalizes understanding and did not state any further concerns.